# Patient Record
Sex: FEMALE | HISPANIC OR LATINO | Employment: FULL TIME | URBAN - METROPOLITAN AREA
[De-identification: names, ages, dates, MRNs, and addresses within clinical notes are randomized per-mention and may not be internally consistent; named-entity substitution may affect disease eponyms.]

---

## 2017-04-21 ENCOUNTER — OFFICE VISIT (OUTPATIENT)
Dept: URGENT CARE | Facility: PHYSICIAN GROUP | Age: 42
End: 2017-04-21
Payer: COMMERCIAL

## 2017-04-21 VITALS
HEIGHT: 62 IN | RESPIRATION RATE: 16 BRPM | TEMPERATURE: 98.4 F | BODY MASS INDEX: 29.26 KG/M2 | SYSTOLIC BLOOD PRESSURE: 120 MMHG | DIASTOLIC BLOOD PRESSURE: 84 MMHG | WEIGHT: 159 LBS | OXYGEN SATURATION: 97 % | HEART RATE: 91 BPM

## 2017-04-21 DIAGNOSIS — R07.89 OTHER CHEST PAIN: ICD-10-CM

## 2017-04-21 PROCEDURE — 99215 OFFICE O/P EST HI 40 MIN: CPT | Performed by: FAMILY MEDICINE

## 2017-04-21 RX ORDER — ASPIRIN 81 MG/1
324 TABLET, CHEWABLE ORAL ONCE
Status: COMPLETED | OUTPATIENT
Start: 2017-04-21 | End: 2017-04-21

## 2017-04-21 RX ADMIN — ASPIRIN 324 MG: 81 TABLET, CHEWABLE ORAL at 12:46

## 2017-04-21 ASSESSMENT — LIFESTYLE VARIABLES: SUBSTANCE_ABUSE: 0

## 2017-04-21 ASSESSMENT — ENCOUNTER SYMPTOMS
DIARRHEA: 0
BLURRED VISION: 0
WEIGHT LOSS: 0
DOUBLE VISION: 0
NERVOUS/ANXIOUS: 0
FEVER: 0
SORE THROAT: 0
FOCAL WEAKNESS: 0
NECK PAIN: 0
HEADACHES: 0
MYALGIAS: 0
FLANK PAIN: 0
CHILLS: 0
ABDOMINAL PAIN: 0
HEMOPTYSIS: 0
PALPITATIONS: 0
SENSORY CHANGE: 0

## 2017-04-21 ASSESSMENT — PAIN SCALES - GENERAL: PAINLEVEL: 7=MODERATE-SEVERE PAIN

## 2017-04-21 NOTE — PROGRESS NOTES
"Subjective:      Flores Arias is a 41 y.o. female who presents with Chest Pressure            Chest Pain   This is a new problem. Episode onset: Onset early this morning waxing and waning. Severe initially. No radiation. Has migrated to shoulders and back. Pressure in character. No nausea. No diaphoresis. +SOB. No cough. No wheeze. No limb swelling. No OTC medications. Pertinent negatives include no abdominal pain, fever, headaches, hemoptysis, malaise/fatigue or palpitations.   No change with exertion or position. No other aggravating or alleviating factors.  She has improved and notes that she now feels \"sore\".     Review of Systems   Constitutional: Negative for fever, chills, weight loss and malaise/fatigue.   HENT: Negative for sore throat.    Eyes: Negative for blurred vision and double vision.   Respiratory: Negative for hemoptysis.    Cardiovascular: Positive for chest pain. Negative for palpitations.   Gastrointestinal: Negative for abdominal pain and diarrhea.   Genitourinary: Negative for hematuria and flank pain.   Musculoskeletal: Negative for myalgias and neck pain.   Skin: Negative for itching and rash.   Neurological: Negative for sensory change, focal weakness and headaches.   Psychiatric/Behavioral: Negative for substance abuse. The patient is not nervous/anxious.    .  Medications, Allergies, and current problem list reviewed today in Epic  PMH: hyperlipidemia, elvated fasting glucose. No cardiac  PSxH: appendectomy 12/2016  Family History   Problem Relation Age of Onset   • Diabetes Mother    • Hypertension Father    • Heart Disease Father      MI age 69   • Diabetes Father    • Alcohol/Drug Brother        Social History     Social History   • Marital Status:      Spouse Name: N/A   • Number of Children: N/A   • Years of Education: N/A     Occupational History   • Not on file.     Social History Main Topics   • Smoking status: Never Smoker    • Smokeless tobacco: Never Used   • " "Alcohol Use: 1.2 oz/week     2 Cans of beer per week      Comment:  4 mixed drinks weekly   • Drug Use: No   • Sexual Activity:     Partners: Male     Other Topics Concern   • Not on file     Social History Narrative            Objective:     /84 mmHg  Pulse 91  Temp(Src) 36.9 °C (98.4 °F)  Resp 16  Ht 1.575 m (5' 2\")  Wt 72.122 kg (159 lb)  BMI 29.07 kg/m2  SpO2 97%  Breastfeeding? No     Physical Exam   Constitutional: She is oriented to person, place, and time. She appears well-developed and well-nourished. No distress.   HENT:   Head: Normocephalic and atraumatic.   Mouth/Throat: Oropharynx is clear and moist.   Eyes: Conjunctivae and EOM are normal. Pupils are equal, round, and reactive to light.   Neck: Neck supple. No JVD present. No tracheal deviation present. No thyromegaly present.   Cardiovascular: Normal rate, regular rhythm and normal heart sounds.  Exam reveals no gallop and no friction rub.    No murmur heard.  Pulmonary/Chest: Effort normal and breath sounds normal. She has no wheezes. She exhibits no tenderness.   Abdominal: Soft. Bowel sounds are normal. She exhibits no mass. There is no tenderness.   Musculoskeletal: She exhibits no edema or tenderness.   Neurological: She is alert and oriented to person, place, and time.   Skin: Skin is warm and dry. No rash noted.   Psychiatric: She has a normal mood and affect. Her behavior is normal.               Assessment/Plan:   EKG: NSR rate:90, normal axis, normal intervals, no evidence of ischemia or hypertrophy.    1. Other chest pain  EKG    aspirin (ASA) chewable tab 324 mg       Differential diagnosis, natural history, supportive care, and indications for immediate follow-up discussed at length.     Referred to ER for further evaluation. ERP notified.   "

## 2017-04-21 NOTE — Clinical Note
April 21, 2017         Patient: Flores Arias   YOB: 1975   Date of Visit: 4/21/2017           To Whom it May Concern:    Flores Arias was seen in my clinic on 4/21/2017. Please excuse today.       Sincerely,           Matthew Gayle M.D.  Electronically Signed

## 2017-04-21 NOTE — MR AVS SNAPSHOT
"        Flores Arias   2017 12:05 PM   Office Visit   MRN: [id number]    Department:  [facility]   Dept Phone:  [phone]    Description:  Female : 1975   Provider:  Matthew Gayle M.D.           Reason for Visit     Chest Pressure x2 days. Pt complains of chest pain/pressure, difficulty breathing. Pain radiating through chest and back. Pt woke up throughout night due to pain. Pt states no Hx of cardiac issues.      Allergies as of 2017     No Known Allergies      You were diagnosed with     Other chest pain   [786.59.ICD-9-CM]         Vital Signs     Blood Pressure Pulse Temperature Respirations Height Weight    120/84 mmHg 91 36.9 °C (98.4 °F) 16 1.575 m (5' 2\") 72.122 kg (159 lb)    Body Mass Index Oxygen Saturation Breastfeeding? Smoking Status          29.07 kg/m2 97% No Never Smoker         Basic Information     Date Of Birth Sex Race Ethnicity Preferred Language    1975 Female  or   Origin (Peruvian,Citizen of [place],Citizen of [place], etc) English      Your appointments     2017  9:20 AM   NEW TO YOU with YAA Jauregui   [facility]    [address]  [facility] [id number] [phone]              Problem List              ICD-10-CM Priority Class Noted - Resolved    Abnormal uterine bleeding (AUB) N93.9   3/30/2016 - Present    Preventative health care Z00.00   3/30/2016 - Present    Mass of left thigh R22.42   3/30/2016 - Present    Thyroid cyst E04.1   3/30/2016 - Present    Hyperlipidemia LDL goal <100 E78.5   2016 - Present    Elevated fasting glucose R73.01   2016 - Present    Right lower quadrant abdominal pain R10.31   2016 - Present      Health Maintenance        Date Due Completion Dates    IMM DTaP/Tdap/Td Vaccine (1 - Tdap) 1993 ---    PAP SMEAR 1995 ---    MAMMOGRAM 2014 ---            Current Immunizations     Hepatitis B Vaccine Recombivax " (Adol/Adult) 9/19/2016, 5/3/2016, 2/4/2016  8:07 AM, 11/12/2015  5:49 PM    Influenza Vaccine Quad Inj (Pf) 5/3/2016      Below and/or attached are the medications your provider expects you to take. Review all of your home medications and newly ordered medications with your provider and/or pharmacist. Follow medication instructions as directed by your provider and/or pharmacist. Please keep your medication list with you and share with your provider. Update the information when medications are discontinued, doses are changed, or new medications (including over-the-counter products) are added; and carry medication information at all times in the event of emergency situations     Allergies:  No Known Allergies          Medications  Valid as of: April 21, 2017 - 12:49 PM    Generic Name Brand Name Tablet Size Instructions for use    Ibuprofen (Tab) MOTRIN 200 MG Take 200 mg by mouth every 6 hours as needed.        .                 Medicines prescribed today were sent to:     [facility] #[id number] - [place], [place] - [address]    [address] [place] [address]    Phone: [phone] Fax: [phone]    Open 24 Hours?: No      Medication refill instructions:       If your prescription bottle indicates you have medication refills left, it is not necessary to call your provider’s office. Please contact your pharmacy and they will refill your medication.    If your prescription bottle indicates you do not have any refills left, you may request refills at any time through one of the following ways: The online [employer] system (except Urgent Care), by calling your provider’s office, or by asking your pharmacy to contact your provider’s office with a refill request. Medication refills are processed only during regular business hours and may not be available until the next business day. Your provider may request additional information or to have a follow-up visit with you prior to refilling your medication.   *Please Note:  Medication refills are assigned a new Rx number when refilled electronically. Your pharmacy may indicate that no refills were authorized even though a new prescription for the same medication is available at the pharmacy. Please request the medicine by name with the pharmacy before contacting your provider for a refill.        Your To Do List     Future Labs/Procedures Complete By Expires    EKG  As directed 4/21/2018         [employer] Access Code: Activation code not generated  Current [employer] Status: Active

## 2017-04-24 ENCOUNTER — OFFICE VISIT (OUTPATIENT)
Dept: MEDICAL GROUP | Facility: MEDICAL CENTER | Age: 42
End: 2017-04-24
Payer: COMMERCIAL

## 2017-04-24 VITALS
WEIGHT: 161 LBS | HEIGHT: 62 IN | TEMPERATURE: 97.6 F | DIASTOLIC BLOOD PRESSURE: 78 MMHG | HEART RATE: 74 BPM | BODY MASS INDEX: 29.63 KG/M2 | OXYGEN SATURATION: 97 % | SYSTOLIC BLOOD PRESSURE: 112 MMHG

## 2017-04-24 DIAGNOSIS — R73.09 ELEVATED HEMOGLOBIN A1C: ICD-10-CM

## 2017-04-24 DIAGNOSIS — Z12.31 ENCOUNTER FOR SCREENING MAMMOGRAM FOR BREAST CANCER: ICD-10-CM

## 2017-04-24 DIAGNOSIS — K21.9 GASTROESOPHAGEAL REFLUX DISEASE WITHOUT ESOPHAGITIS: ICD-10-CM

## 2017-04-24 DIAGNOSIS — R07.89 ATYPICAL CHEST PAIN: ICD-10-CM

## 2017-04-24 DIAGNOSIS — E78.5 HYPERLIPIDEMIA LDL GOAL <100: ICD-10-CM

## 2017-04-24 PROCEDURE — 99214 OFFICE O/P EST MOD 30 MIN: CPT | Performed by: NURSE PRACTITIONER

## 2017-04-24 RX ORDER — OMEPRAZOLE 20 MG/1
20 CAPSULE, DELAYED RELEASE ORAL DAILY
Qty: 30 CAP | Refills: 0 | Status: SHIPPED | OUTPATIENT
Start: 2017-04-24 | End: 2018-11-14

## 2017-04-24 ASSESSMENT — PATIENT HEALTH QUESTIONNAIRE - PHQ9: CLINICAL INTERPRETATION OF PHQ2 SCORE: 0

## 2017-04-24 NOTE — PROGRESS NOTES
Chief Complaint   Patient presents with   • Establish Care     follow up from  Friday      Flores Arias is a 41 y.o. female here to transfer care from [facility]. We discussed:    Elevated hemoglobin A1c  3/2016 a1c 6.2  Sometimes feeling thirsty  No polyuria, no numbness or tingling  Not watching diet, and exercise   both parents have diabetes  Atypical chest pain  Last week suddenly began having significant chest pain, radiating to bilateral shoulders. This one on throughout the night until the following day when she presented to urgent care for evaluation, EKG normal at that time. No severe symptoms since that time  States that pain began right after eating  She has no pain currently but has felt the last few nights when lying in bed, generalized chest discomfort, having some abdominal bloating and discomfort as well  No recent illness, no cough, dizziness, shortness of breath. No history of heartburn, no frequent NSAID use, minimal alcohol. No constipation, diarrhea  Hyperlipidemia LDL goal <100  Last labs in 2016 total cholesterol 243, triglyceride 151, HDL 51,   She's not made any dietary change since that time  Father had MI in his late 60s  Diet is fairly poor  No regular exercise      Current medicines (including changes today)  Current Outpatient Prescriptions   Medication Sig Dispense Refill   • omeprazole (PRILOSEC) 20 MG delayed-release capsule Take 1 Cap by mouth every day. 30 Cap 0   • ibuprofen (MOTRIN) 200 MG Tab Take 200 mg by mouth every 6 hours as needed.       No current facility-administered medications for this visit.     She  has no past medical history of ASTHMA or Diabetes.  She  has past surgical history that includes breast biopsy (2015) and appendectomy laparoscopic (N/A, 12/6/2016).  Social History   Substance Use Topics   • Smoking status: Never Smoker    • Smokeless tobacco: Never Used   • Alcohol Use: 1.2 oz/week     2 Cans of beer per week      Comment:  4 mixed  "drinks weekly     Social History     Social History Narrative     Family History   Problem Relation Age of Onset   • Diabetes Mother    • Hypertension Father    • Heart Disease Father      MI age 69   • Diabetes Father    • Alcohol/Drug Brother      Family Status   Relation Status Death Age   • Mother Alive    • Father Alive    • Brother Alive    • Daughter Alive    • Son Alive      ROS  Problems listed discussed above, all other systems reviewed and negative     Objective:     Blood pressure 112/78, pulse 74, temperature 36.4 °C (97.6 °F), height 1.575 m (5' 2\"), weight 73.029 kg (161 lb), SpO2 97 %. Body mass index is 29.44 kg/(m^2).  Physical Exam:  General: Alert, oriented in no acute distress.  Eye contact is good, speech is normal, affect calm  HEENT: perrl, Oral mucosa pink moist, no lesions. Nares patent. TMs gray with good landmarks bilaterally. No cervical or supraclavicular lymphadenopathy  Lungs: clear to auscultation bilaterally, good aeration, normal effort. No wheeze/ rhonchi/ rales.  CV: regular rate and rhythm, S1, S2. No murmur, no JVD, no edema. Pedal pulses 2 + bilaterally  Abdomen: soft, nontender, BS x4  Ext: color normal, vascularity normal, temperature normal. No rash or lesions.  MS: no point tenderness over anterior posterior chest wall  Assessment and Plan:   The following treatment plan was discussed  1. Elevated hemoglobin A1c   last A1c 6.2, both parents with diabetes. No exercise, reports poor diet. Recheck labs, consider starting metformin  HEMOGLOBIN A1C   2. Hyperlipidemia LDL goal <100    with last labs  COMP METABOLIC PANEL    LIPID PROFILE   3. Gastroesophageal reflux disease without esophagitis   onset of chest pain last week, evaluated in urgent care and with normal EKG. Pain is improved since that time but she does still have some symptoms when lying in bed. Initial onset of pain was right after a meal, she did feel some upward radiation that may be consistent with " GERD. Will start trial of:  omeprazole (PRILOSEC) 20 MG delayed-release capsule   4. Atypical chest pain   as discussed above    5. Encounter for screening mammogram for breast cancer  MA-SCREEN MAMMO W/CAD-BILAT       Educated in proper administration of medication(s) ordered today including safety, possible SE, risks, benefits, rationale and alternatives to therapy.   Followup: Pending labs             Please note that this dictation was created using voice recognition software. I have worked with consultants from the vendor as well as technical experts from [employer] to optimize the interface. I have made every reasonable attempt to correct obvious errors, but I expect that there are errors of grammar and possibly content that I did not discover before finalizing the note.

## 2017-04-24 NOTE — ASSESSMENT & PLAN NOTE
Last labs in 2016 total cholesterol 243, triglyceride 151, HDL 51,   She's not made any dietary change since that time  Father had MI in his late 60s  Diet is fairly poor  No regular exercise

## 2017-04-24 NOTE — MR AVS SNAPSHOT
"        Flores Arias   2017 9:20 AM   Office Visit   MRN: [id number]    Department:  [facility]   Dept Phone:  [phone]    Description:  Female : 1975   Provider:  YAA Jauregui           Reason for Visit     Establish Care follow up from  Friday       Allergies as of 2017     No Known Allergies      You were diagnosed with     Elevated hemoglobin A1c   [264939]       Hyperlipidemia LDL goal <100   [705109]       Gastroesophageal reflux disease without esophagitis   [391536]       Encounter for screening mammogram for breast cancer   [5434831]         Vital Signs     Blood Pressure Pulse Temperature Height Weight Body Mass Index    112/78 mmHg 74 36.4 °C (97.6 °F) 1.575 m (5' 2\") 73.029 kg (161 lb) 29.44 kg/m2    Oxygen Saturation Smoking Status                97% Never Smoker           Basic Information     Date Of Birth Sex Race Ethnicity Preferred Language    1975 Female  or   Origin (Vietnamese,Namibian,Canadian,Honduran, etc) English      Problem List              ICD-10-CM Priority Class Noted - Resolved    Abnormal uterine bleeding (AUB) N93.9   3/30/2016 - Present    Preventative health care Z00.00   3/30/2016 - Present    Mass of left thigh R22.42   3/30/2016 - Present    Thyroid cyst E04.1   3/30/2016 - Present    Hyperlipidemia LDL goal <100 E78.5   2016 - Present    Elevated fasting glucose R73.01   2016 - Present    Right lower quadrant abdominal pain R10.31   2016 - Present    Elevated hemoglobin A1c R73.09   2017 - Present      Health Maintenance        Date Due Completion Dates    IMM DTaP/Tdap/Td Vaccine (1 - Tdap) 1993 ---    PAP SMEAR 1995 ---    MAMMOGRAM 2014 ---            Current Immunizations     Hepatitis B Vaccine Recombivax (Adol/Adult) 2016, 5/3/2016, 2016  8:07 AM, 2015  5:49 PM    Influenza Vaccine Quad Inj (Pf) 5/3/2016      Below and/or attached are the medications " your provider expects you to take. Review all of your home medications and newly ordered medications with your provider and/or pharmacist. Follow medication instructions as directed by your provider and/or pharmacist. Please keep your medication list with you and share with your provider. Update the information when medications are discontinued, doses are changed, or new medications (including over-the-counter products) are added; and carry medication information at all times in the event of emergency situations     Allergies:  No Known Allergies          Medications  Valid as of: April 24, 2017 -  9:51 AM    Generic Name Brand Name Tablet Size Instructions for use    Ibuprofen (Tab) MOTRIN 200 MG Take 200 mg by mouth every 6 hours as needed.        Omeprazole (CAPSULE DELAYED RELEASE) PRILOSEC 20 MG Take 1 Cap by mouth every day.        .                 Medicines prescribed today were sent to:     [facility] #[id number] - [address] [address]    Phone: [phone] Fax: [phone]    Open 24 Hours?: No      Medication refill instructions:       If your prescription bottle indicates you have medication refills left, it is not necessary to call your provider’s office. Please contact your pharmacy and they will refill your medication.    If your prescription bottle indicates you do not have any refills left, you may request refills at any time through one of the following ways: The online [employer] system (except Urgent Care), by calling your provider’s office, or by asking your pharmacy to contact your provider’s office with a refill request. Medication refills are processed only during regular business hours and may not be available until the next business day. Your provider may request additional information or to have a follow-up visit with you prior to refilling your medication.   *Please Note: Medication refills are assigned a new Rx number when refilled electronically. Your  pharmacy may indicate that no refills were authorized even though a new prescription for the same medication is available at the pharmacy. Please request the medicine by name with the pharmacy before contacting your provider for a refill.        Your To Do List     Future Labs/Procedures Complete By Expires    COMP METABOLIC PANEL  As directed 4/25/2018    HEMOGLOBIN A1C  As directed 4/25/2018    LIPID PROFILE  As directed 4/25/2018    MA-SCREEN MAMMO W/CAD-BILAT  As directed 5/26/2018         MyChart Access Code: Activation code not generated  Current [employer] Status: Active

## 2017-04-24 NOTE — ASSESSMENT & PLAN NOTE
3/2016 a1c 6.2  Sometimes feeling thirsty  No polyuria, no numbness or tingling  Not watching diet, and exercise   both parents have diabetes

## 2017-04-24 NOTE — ASSESSMENT & PLAN NOTE
Last week suddenly began having significant chest pain, radiating to bilateral shoulders  Presented to urgent care for evaluation on the 21st, EKG normal at that time.  States that pain began right after eating, remains severe throughout that night and has lessened since that time  She has no pain currently but has felt the last few nights when lying in bed, generalized chest discomfort, having some abdominal bloating and discomfort as well  No recent illness, no cough, dizziness, shortness of breath. No history of heartburn, no frequent NSAID use, minimal alcohol. No constipation, diarrhea

## 2017-05-05 ENCOUNTER — HOSPITAL ENCOUNTER (OUTPATIENT)
Dept: RADIOLOGY | Facility: MEDICAL CENTER | Age: 42
End: 2017-05-05

## 2017-06-12 ENCOUNTER — NON-PROVIDER VISIT (OUTPATIENT)
Dept: URGENT CARE | Facility: PHYSICIAN GROUP | Age: 42
End: 2017-06-12

## 2017-06-12 DIAGNOSIS — Z02.1 PRE-EMPLOYMENT DRUG SCREENING: ICD-10-CM

## 2017-06-12 LAB
AMP AMPHETAMINE: NORMAL
COC COCAINE: NORMAL
INT CON NEG: NEGATIVE
INT CON POS: POSITIVE
MET METHAMPHETAMINES: NORMAL
OPI OPIATES: NORMAL
PCP PHENCYCLIDINE: NORMAL
POC DRUG COMMENT 753798-OCCUPATIONAL HEALTH: NORMAL
THC: NORMAL

## 2017-06-12 PROCEDURE — 80305 DRUG TEST PRSMV DIR OPT OBS: CPT | Performed by: PHYSICIAN ASSISTANT

## 2018-02-05 ENCOUNTER — OFFICE VISIT (OUTPATIENT)
Dept: MEDICAL GROUP | Facility: MEDICAL CENTER | Age: 43
End: 2018-02-05
Payer: COMMERCIAL

## 2018-02-05 VITALS
TEMPERATURE: 98.2 F | SYSTOLIC BLOOD PRESSURE: 110 MMHG | DIASTOLIC BLOOD PRESSURE: 64 MMHG | HEART RATE: 76 BPM | RESPIRATION RATE: 16 BRPM | HEIGHT: 62 IN | BODY MASS INDEX: 26.43 KG/M2 | WEIGHT: 143.6 LBS | OXYGEN SATURATION: 97 %

## 2018-02-05 DIAGNOSIS — R73.09 ELEVATED HEMOGLOBIN A1C: ICD-10-CM

## 2018-02-05 DIAGNOSIS — R22.42 MASS OF LEFT THIGH: ICD-10-CM

## 2018-02-05 PROCEDURE — 99214 OFFICE O/P EST MOD 30 MIN: CPT | Performed by: NURSE PRACTITIONER

## 2018-02-05 RX ORDER — DIAZEPAM 5 MG/1
TABLET ORAL
Qty: 1 TAB | Refills: 0 | Status: SHIPPED | OUTPATIENT
Start: 2018-02-05 | End: 2018-02-10

## 2018-02-05 RX ORDER — M-VIT,TX,IRON,MINS/CALC/FOLIC 27MG-0.4MG
1 TABLET ORAL DAILY
COMMUNITY
End: 2018-11-14

## 2018-02-06 ENCOUNTER — HOSPITAL ENCOUNTER (OUTPATIENT)
Dept: RADIOLOGY | Facility: MEDICAL CENTER | Age: 43
End: 2018-02-06
Attending: NURSE PRACTITIONER
Payer: COMMERCIAL

## 2018-02-06 DIAGNOSIS — Z12.31 ENCOUNTER FOR SCREENING MAMMOGRAM FOR BREAST CANCER: ICD-10-CM

## 2018-02-06 PROCEDURE — 77063 BREAST TOMOSYNTHESIS BI: CPT

## 2018-02-06 NOTE — ASSESSMENT & PLAN NOTE
6.2 in 2016  Has cut out sugar and soda, lost 20 lbs  Both parents have diabetes  No regular exercise  No polyuria, polydipsia, numbness or tingling in extremities

## 2018-02-06 NOTE — PROGRESS NOTES
"Subjective:     Chief Complaint   Patient presents with   • Mass     left leg      Flores Arias is a 42 y.o. female here today to follow up on:    Elevated hemoglobin A1c  6.2 in 2016  Has cut out sugar and soda, lost 20 lbs  Both parents have diabetes  No regular exercise  No polyuria, polydipsia, numbness or tingling in extremities     Mass of left thigh  Mass on the left thigh that's been present for about 2 years, ultrasound at 2016 showing 16 mm atypical mass that could possibly be a lymph node. 6 month follow-up had been recommended the patient did not return at that time. She now reports mass is about twice as big. No associated pain, no impact on mobility. No unexplained weight loss, fatigue       Current medicines (including changes today)  Current Outpatient Prescriptions   Medication Sig Dispense Refill   • therapeutic multivitamin-minerals (THERAGRAN-M) Tab Take 1 Tab by mouth every day.     • diazePAM (VALIUM) 5 MG Tab 1 tab PO 30 minutes prior to procedure 1 Tab 0   • omeprazole (PRILOSEC) 20 MG delayed-release capsule Take 1 Cap by mouth every day. 30 Cap 0   • ibuprofen (MOTRIN) 200 MG Tab Take 200 mg by mouth every 6 hours as needed.       No current facility-administered medications for this visit.      She  has no past medical history of ASTHMA or Diabetes.    ROS included above     Objective:     Blood pressure 110/64, pulse 76, temperature 36.8 °C (98.2 °F), resp. rate 16, height 1.575 m (5' 2\"), weight 65.1 kg (143 lb 9.6 oz), SpO2 97 %, not currently breastfeeding. Body mass index is 26.26 kg/m².     Physical Exam:  General: Alert, oriented in no acute distress.  Eye contact is good, speech is normal, affect calm  Lungs: clear to auscultation bilaterally, normal effort, no wheeze/ rhonchi/ rales.  CV: regular rate and rhythm, S1, S2, no murmur  Abdomen: soft, nontender  Ext: approximately 6 cm mobile nontender mass in the L groin/ upper thigh. no edema, color normal, vascularity normal, " temperature normal    Assessment and Plan:   The following treatment plan was discussed   1. Mass of left thigh  Mass has doubled in size since last evaluated. Will check MRI. One time dose of diazepam for procedure, advised she will need a . F/u pending result    MR-LOWER EXTREMITY-NO JOINT-W/O LEFT    diazePAM (VALIUM) 5 MG Tab   2. Elevated hemoglobin A1c  She has been working on diet and weight loss, down 20 lbs. She will complete labs as previously ordered       Followup: pending tests       Please note that this dictation was created using voice recognition software. I have worked with consultants from the vendor as well as technical experts from [employer] to optimize the interface. I have made every reasonable attempt to correct obvious errors, but I expect that there are errors of grammar and possibly content that I did not discover before finalizing the note.

## 2018-02-06 NOTE — ASSESSMENT & PLAN NOTE
Mass on the left thigh that's been present for about 2 years, ultrasound at 2016 showing 16 mm atypical mass that could possibly be a lymph node. 6 month follow-up had been recommended the patient did not return at that time. She now reports mass is about twice as big. No associated pain, no impact on mobility. No unexplained weight loss, fatigue

## 2018-02-15 ENCOUNTER — APPOINTMENT (OUTPATIENT)
Dept: RADIOLOGY | Facility: MEDICAL CENTER | Age: 43
End: 2018-02-15
Attending: NURSE PRACTITIONER
Payer: COMMERCIAL

## 2018-02-15 DIAGNOSIS — R22.42 MASS OF LEFT THIGH: ICD-10-CM

## 2018-02-15 PROCEDURE — 700117 HCHG RX CONTRAST REV CODE 255: Performed by: NURSE PRACTITIONER

## 2018-02-15 PROCEDURE — A9579 GAD-BASE MR CONTRAST NOS,1ML: HCPCS | Performed by: NURSE PRACTITIONER

## 2018-02-15 PROCEDURE — 73720 MRI LWR EXTREMITY W/O&W/DYE: CPT | Mod: LT

## 2018-02-15 RX ADMIN — GADODIAMIDE 15 ML: 287 INJECTION INTRAVENOUS at 13:01

## 2018-03-06 ENCOUNTER — HOSPITAL ENCOUNTER (OUTPATIENT)
Dept: LAB | Facility: MEDICAL CENTER | Age: 43
End: 2018-03-06
Attending: NURSE PRACTITIONER
Payer: COMMERCIAL

## 2018-03-06 ENCOUNTER — OFFICE VISIT (OUTPATIENT)
Dept: MEDICAL GROUP | Facility: MEDICAL CENTER | Age: 43
End: 2018-03-06
Payer: COMMERCIAL

## 2018-03-06 ENCOUNTER — HOSPITAL ENCOUNTER (OUTPATIENT)
Facility: MEDICAL CENTER | Age: 43
End: 2018-03-06
Attending: NURSE PRACTITIONER
Payer: COMMERCIAL

## 2018-03-06 VITALS
TEMPERATURE: 97.8 F | SYSTOLIC BLOOD PRESSURE: 110 MMHG | HEIGHT: 62 IN | BODY MASS INDEX: 25.17 KG/M2 | DIASTOLIC BLOOD PRESSURE: 72 MMHG | RESPIRATION RATE: 12 BRPM | OXYGEN SATURATION: 98 % | HEART RATE: 76 BPM | WEIGHT: 136.8 LBS

## 2018-03-06 DIAGNOSIS — E78.5 HYPERLIPIDEMIA LDL GOAL <100: ICD-10-CM

## 2018-03-06 DIAGNOSIS — R73.09 ELEVATED HEMOGLOBIN A1C: ICD-10-CM

## 2018-03-06 DIAGNOSIS — N92.6 IRREGULAR MENSES: ICD-10-CM

## 2018-03-06 DIAGNOSIS — Z01.419 ENCOUNTER FOR GYNECOLOGICAL EXAMINATION WITHOUT ABNORMAL FINDING: ICD-10-CM

## 2018-03-06 LAB
ALBUMIN SERPL BCP-MCNC: 4.1 G/DL (ref 3.2–4.9)
ALBUMIN/GLOB SERPL: 1.5 G/DL
ALP SERPL-CCNC: 45 U/L (ref 30–99)
ALT SERPL-CCNC: 12 U/L (ref 2–50)
ANION GAP SERPL CALC-SCNC: 8 MMOL/L (ref 0–11.9)
AST SERPL-CCNC: 16 U/L (ref 12–45)
BILIRUB SERPL-MCNC: 0.6 MG/DL (ref 0.1–1.5)
BUN SERPL-MCNC: 9 MG/DL (ref 8–22)
CALCIUM SERPL-MCNC: 9.2 MG/DL (ref 8.5–10.5)
CHLORIDE SERPL-SCNC: 104 MMOL/L (ref 96–112)
CHOLEST SERPL-MCNC: 167 MG/DL (ref 100–199)
CO2 SERPL-SCNC: 29 MMOL/L (ref 20–33)
CREAT SERPL-MCNC: 0.76 MG/DL (ref 0.5–1.4)
EST. AVERAGE GLUCOSE BLD GHB EST-MCNC: 126 MG/DL
GLOBULIN SER CALC-MCNC: 2.8 G/DL (ref 1.9–3.5)
GLUCOSE SERPL-MCNC: 87 MG/DL (ref 65–99)
HBA1C MFR BLD: 6 % (ref 0–5.6)
HDLC SERPL-MCNC: 56 MG/DL
LDLC SERPL CALC-MCNC: 94 MG/DL
POTASSIUM SERPL-SCNC: 3.4 MMOL/L (ref 3.6–5.5)
PROT SERPL-MCNC: 6.9 G/DL (ref 6–8.2)
SODIUM SERPL-SCNC: 141 MMOL/L (ref 135–145)
TRIGL SERPL-MCNC: 85 MG/DL (ref 0–149)

## 2018-03-06 PROCEDURE — 36415 COLL VENOUS BLD VENIPUNCTURE: CPT

## 2018-03-06 PROCEDURE — 99396 PREV VISIT EST AGE 40-64: CPT | Performed by: NURSE PRACTITIONER

## 2018-03-06 PROCEDURE — 99000 SPECIMEN HANDLING OFFICE-LAB: CPT | Performed by: NURSE PRACTITIONER

## 2018-03-06 PROCEDURE — 80053 COMPREHEN METABOLIC PANEL: CPT

## 2018-03-06 PROCEDURE — 88175 CYTOPATH C/V AUTO FLUID REDO: CPT

## 2018-03-06 PROCEDURE — 87624 HPV HI-RISK TYP POOLED RSLT: CPT

## 2018-03-06 PROCEDURE — 83036 HEMOGLOBIN GLYCOSYLATED A1C: CPT

## 2018-03-06 PROCEDURE — 80061 LIPID PANEL: CPT

## 2018-03-06 NOTE — PROGRESS NOTES
CC:  Pap/Well Woman Exam    History of present illness:  Flores Arias is 42 y.o.  female presenting today for well woman exam with gynecological exam and Pap smear. Menstrual cycle has been irregular in the last 2 years, ranging from 48 weeks at a time. She had been having very heavy bleeding about 2 years ago, was seen by Dr. Griffith at that time and had an ultrasound which showed fibroids. She had an endometrial biopsy which was benign. Since then her periods have been better, not as heavy or painful.  Last Pap was several years ago, she did have HPV in the past. She is , monogamous, denies concerns for STDs.  has had vasectomy. She is up-to-date on mammogram. We also discussed:    Elevated hemoglobin A1c  Last A1c 6.2 in 2016  She was able to lose some weight, has changed her diet  Due for follow-up labs  No polyuria, polydipsia    Hyperlipidemia LDL goal <100  Last   She's been working on diet  Not exercising regularly      No past medical history on file.    Past Surgical History:   Procedure Laterality Date   • APPENDECTOMY LAPAROSCOPIC N/A 2016    Procedure: APPENDECTOMY LAPAROSCOPIC;  Surgeon: Pilar Jennings M.D.;  Location: SURGERY [facility];  Service:    • BREAST BIOPSY      left breast, benign biopsy       Outpatient Encounter Prescriptions as of 3/6/2018   Medication Sig Dispense Refill   • therapeutic multivitamin-minerals (THERAGRAN-M) Tab Take 1 Tab by mouth every day.     • omeprazole (PRILOSEC) 20 MG delayed-release capsule Take 1 Cap by mouth every day. 30 Cap 0   • ibuprofen (MOTRIN) 200 MG Tab Take 200 mg by mouth every 6 hours as needed.       No facility-administered encounter medications on file as of 3/6/2018.        Patient Active Problem List    Diagnosis Date Noted   • Elevated hemoglobin A1c 2017   • Atypical chest pain 2017   • Hyperlipidemia LDL goal <100 2016   • Elevated fasting glucose 2016   • Abnormal  "uterine bleeding (AUB) 03/30/2016   • Preventative health care 03/30/2016   • Mass of left thigh 03/30/2016   • Thyroid cyst 03/30/2016       .  Social History     Social History   • Marital status:      Spouse name: N/A   • Number of children: N/A   • Years of education: N/A     Occupational History   • Not on file.     Social History Main Topics   • Smoking status: Never Smoker   • Smokeless tobacco: Never Used   • Alcohol use 1.2 oz/week     2 Cans of beer per week      Comment:  4 mixed drinks weekly   • Drug use: No   • Sexual activity: Yes     Partners: Male     Other Topics Concern   • Not on file     Social History Narrative   • No narrative on file       Family History   Problem Relation Age of Onset   • Diabetes Mother    • Hypertension Father    • Heart Disease Father      MI age 69   • Diabetes Father    • Alcohol/Drug Brother          ROS: Denies Weight loss, fatigue, chest pain, SOB, bowel or bladder changes. No significant dysmenorrhea, concerning vaginal discharge or irritation, no dyspareunia or postcoital bleeding. Denies h/o migraine with aura. Denies musculoskeletal, neurological, or psychiatric problems.      /72   Pulse 76   Temp 36.6 °C (97.8 °F)   Resp 12   Ht 1.575 m (5' 2\")   Wt 62.1 kg (136 lb 12.8 oz)   LMP 01/30/2018   SpO2 98%   Breastfeeding? No   BMI 25.02 kg/m²     GEN:  Appears well and in no apparent distress   NECK:  Supple without adenopathy or thyromegaly  LUNGS:  Clear and equal. No wheeze, ronchi, or rales.  CV:  RRR, S1, S2. No murmur.  Pedal pulses 2+ bilaterally.  BREAST:  Symmetrical without masses. No nipple discharge.  ABD:  Soft, non-tender, non-distended, normal bowel sounds.  No hepatosplenomegaly.  :  Normal external female genitalia.  Vaginal canal clear.  Cervix appears normal. Specimen collected from transformation zone. Bimanual exam:  No CMT, normal size uterus without masses or tenderness; no adnexal masses or tenderness.      Assessment " and plan    1. Encounter for gynecological examination without abnormal finding  Normal exam. Pap sent, follow-up pending results. Breast self-exam taught and encouraged monthly. General health and wellness discussion including healthy diet, regular exercise  - THINPREP PAP WITH HPV; Future    2. Hyperlipidemia LDL goal <100  Repeat labs  - LIPID PROFILE; Future  - COMP METABOLIC PANEL; Future    3. Irregular menses  - TSH WITH REFLEX TO FT4; Future    4. Elevated hemoglobin A1c  Last A1c 6.2. Working on diet and exercise. Reevaluate  - HEMOGLOBIN A1C; Future      F/u pending results

## 2018-03-06 NOTE — ASSESSMENT & PLAN NOTE
Last A1c 6.2 in 2016  She was able to lose some weight, has changed her diet  Due for follow-up labs  No polyuria, polydipsia

## 2018-03-06 NOTE — ASSESSMENT & PLAN NOTE
Irregular cycle every 4-6 weeks, sometimes heavy bleeding, sometimes light  Prior u/s with fibroids  Saw Dr Cervantes and had benign endometrial biopsy

## 2018-03-07 LAB
CYTOLOGY REG CYTOL: NORMAL
HPV HR 12 DNA CVX QL NAA+PROBE: NEGATIVE
HPV16 DNA SPEC QL NAA+PROBE: NEGATIVE
HPV18 DNA SPEC QL NAA+PROBE: NEGATIVE
SPECIMEN SOURCE: NORMAL

## 2018-03-09 ENCOUNTER — HOSPITAL ENCOUNTER (OUTPATIENT)
Dept: LAB | Facility: MEDICAL CENTER | Age: 43
End: 2018-03-09
Attending: NURSE PRACTITIONER
Payer: COMMERCIAL

## 2018-03-09 LAB — TSH SERPL DL<=0.005 MIU/L-ACNC: 1.26 UIU/ML (ref 0.38–5.33)

## 2018-03-09 PROCEDURE — 84443 ASSAY THYROID STIM HORMONE: CPT

## 2018-03-09 PROCEDURE — 36415 COLL VENOUS BLD VENIPUNCTURE: CPT

## 2018-05-23 ENCOUNTER — OFFICE VISIT (OUTPATIENT)
Dept: MEDICAL GROUP | Facility: MEDICAL CENTER | Age: 43
End: 2018-05-23
Payer: COMMERCIAL

## 2018-05-23 VITALS
HEART RATE: 78 BPM | DIASTOLIC BLOOD PRESSURE: 70 MMHG | TEMPERATURE: 97.6 F | OXYGEN SATURATION: 99 % | RESPIRATION RATE: 16 BRPM | SYSTOLIC BLOOD PRESSURE: 102 MMHG | HEIGHT: 62 IN | WEIGHT: 133 LBS | BODY MASS INDEX: 24.48 KG/M2

## 2018-05-23 DIAGNOSIS — Z30.09 FAMILY PLANNING: ICD-10-CM

## 2018-05-23 PROCEDURE — 99213 OFFICE O/P EST LOW 20 MIN: CPT | Performed by: NURSE PRACTITIONER

## 2018-05-23 RX ORDER — NORGESTIMATE AND ETHINYL ESTRADIOL 0.25-0.035
1 KIT ORAL DAILY
Qty: 84 TAB | Refills: 3 | Status: SHIPPED | OUTPATIENT
Start: 2018-05-23 | End: 2018-11-14

## 2018-05-23 ASSESSMENT — PATIENT HEALTH QUESTIONNAIRE - PHQ9: CLINICAL INTERPRETATION OF PHQ2 SCORE: 0

## 2018-05-23 NOTE — ASSESSMENT & PLAN NOTE
Requesting oral contraceptive. She recently  from her  is now in a new relationship, she reports consistent use of condoms but would like to be on birth control to be sure.  She had received Depo-Provera in the past but has never taken an oral contraceptive. She was not happy with Depo-Provera side effects  No history of DVT, clotting disorder, liver disorder. No migraine with aura  Pap completed in March and normal

## 2018-05-23 NOTE — PROGRESS NOTES
"Subjective:     Chief Complaint   Patient presents with   • Contraception     Flores Arias is a 42 y.o. female here today to follow up on:    Family planning  Requesting oral contraceptive. She recently  from her  is now in a new relationship, she reports consistent use of condoms but would like to be on birth control to be sure.  She had received Depo-Provera in the past but has never taken an oral contraceptive. She was not happy with Depo-Provera side effects  No history of DVT, clotting disorder, liver disorder. No migraine with aura  Pap completed in March and normal       Current medicines (including changes today)  Current Outpatient Prescriptions   Medication Sig Dispense Refill   • norgestimate-ethinyl estradiol (ORTHO-CYCLEN) 0.25-35 MG-MCG per tablet Take 1 Tab by mouth every day. 84 Tab 3   • therapeutic multivitamin-minerals (THERAGRAN-M) Tab Take 1 Tab by mouth every day.     • omeprazole (PRILOSEC) 20 MG delayed-release capsule Take 1 Cap by mouth every day. 30 Cap 0   • ibuprofen (MOTRIN) 200 MG Tab Take 200 mg by mouth every 6 hours as needed.       No current facility-administered medications for this visit.      She  has no past medical history of ASTHMA or Diabetes.    ROS included above     Objective:     Blood pressure 102/70, pulse 78, temperature 36.4 °C (97.6 °F), resp. rate 16, height 1.575 m (5' 2\"), weight 60.3 kg (133 lb), SpO2 99 %, not currently breastfeeding. Body mass index is 24.33 kg/m².     Physical Exam:  General: Alert, oriented in no acute distress.  Eye contact is good, speech is normal, affect calm  Lungs: clear to auscultation bilaterally, normal effort, no wheeze/ rhonchi/ rales.  CV: regular rate and rhythm, S1, S2, no murmur  Abdomen: soft, nontender, No CVAT, no hepatosplenomegaly  Ext: no edema, color normal, vascularity normal, temperature normal    Assessment and Plan:   The following treatment plan was discussed   1. Family planning  Requesting " start oral contraceptive. No history of hypertension, DVT, no tobacco use. last menstrual period one week ago. Will start:  norgestimate-ethinyl estradiol (ORTHO-CYCLEN) 0.25-35 MG-MCG per tablet  We discussed that birth control pills can increase risk of blood clots, liver tumors, gallbladder disease, and stroke. Side effects can include headache, constipation nausea. They do not protect against STDs. They are not effective if not taken everyday, if more than one pill is missed a backup method should be used. Antibiotics can make the pill less effective, she takes antibiotic a backup method should be used. Any unusual headache leg pain chest pain shortness of breath difficulty speaking or moving should be addressed immediately.           Followup: annually         Please note that this dictation was created using voice recognition software. I have worked with consultants from the vendor as well as technical experts from [facility] [employer] to optimize the interface. I have made every reasonable attempt to correct obvious errors, but I expect that there are errors of grammar and possibly content that I did not discover before finalizing the note.

## 2018-08-15 ENCOUNTER — OFFICE VISIT (OUTPATIENT)
Dept: MEDICAL GROUP | Facility: MEDICAL CENTER | Age: 43
End: 2018-08-15
Payer: COMMERCIAL

## 2018-08-15 ENCOUNTER — HOSPITAL ENCOUNTER (OUTPATIENT)
Facility: MEDICAL CENTER | Age: 43
End: 2018-08-15
Attending: NURSE PRACTITIONER
Payer: COMMERCIAL

## 2018-08-15 VITALS
HEIGHT: 62 IN | RESPIRATION RATE: 16 BRPM | HEART RATE: 81 BPM | DIASTOLIC BLOOD PRESSURE: 80 MMHG | OXYGEN SATURATION: 97 % | BODY MASS INDEX: 23.55 KG/M2 | TEMPERATURE: 97.6 F | SYSTOLIC BLOOD PRESSURE: 110 MMHG | WEIGHT: 128 LBS

## 2018-08-15 DIAGNOSIS — R19.8 ABDOMINAL FULLNESS: ICD-10-CM

## 2018-08-15 DIAGNOSIS — Z11.3 SCREEN FOR STD (SEXUALLY TRANSMITTED DISEASE): ICD-10-CM

## 2018-08-15 PROCEDURE — 99214 OFFICE O/P EST MOD 30 MIN: CPT | Performed by: NURSE PRACTITIONER

## 2018-08-15 PROCEDURE — 87591 N.GONORRHOEAE DNA AMP PROB: CPT

## 2018-08-15 PROCEDURE — 87491 CHLMYD TRACH DNA AMP PROBE: CPT

## 2018-08-15 PROCEDURE — 87660 TRICHOMONAS VAGIN DIR PROBE: CPT

## 2018-08-15 PROCEDURE — 87510 GARDNER VAG DNA DIR PROBE: CPT

## 2018-08-15 PROCEDURE — 87480 CANDIDA DNA DIR PROBE: CPT

## 2018-08-15 NOTE — PROGRESS NOTES
"Subjective:     Chief Complaint   Patient presents with   • Other     STD TESTING     Flores Arias is a 42 y.o. female established patient here requesting STD screening.  She has had a new partner in the last few months, is not consistently using condoms.  She is on oral contraceptive and doing well with this, last menstrual period 2 weeks ago.  She denies any specific concerns including vaginal discharge, dysuria, pelvic pain.  She has lost weight recently through diet and exercise and feels that she has some lower abdominal distention and fullness.  She was noted to have a fibroid in 2016.    No problem-specific Assessment & Plan notes found for this encounter.       Current medicines (including changes today)  Current Outpatient Prescriptions   Medication Sig Dispense Refill   • norgestimate-ethinyl estradiol (ORTHO-CYCLEN) 0.25-35 MG-MCG per tablet Take 1 Tab by mouth every day. 84 Tab 3   • therapeutic multivitamin-minerals (THERAGRAN-M) Tab Take 1 Tab by mouth every day.     • omeprazole (PRILOSEC) 20 MG delayed-release capsule Take 1 Cap by mouth every day. 30 Cap 0   • ibuprofen (MOTRIN) 200 MG Tab Take 200 mg by mouth every 6 hours as needed.       No current facility-administered medications for this visit.      She  has no past medical history of ASTHMA or Diabetes.    ROS included above     Objective:     Blood pressure 110/80, pulse 81, temperature 36.4 °C (97.6 °F), resp. rate 16, height 1.575 m (5' 2\"), weight 58.1 kg (128 lb), SpO2 97 %. Body mass index is 23.41 kg/m².     Physical Exam:  General: Alert, oriented in no acute distress.  Eye contact is good, speech is normal, affect calm  Lungs: clear to auscultation bilaterally, normal effort, no wheeze/ rhonchi/ rales.  CV: regular rate and rhythm, S1, S2, no murmur  Abdomen: soft, nontender  Pelvic: external genitalia pink, moist. No lesions. Vaginal canal with scant white discharge. Cervical os clear, no visible lesions.  Vaginal swabs " collected one. Bimanual: uterus possibly slightly enlarged, midline, no CMT, no adnexal masses or tenderness.  Ext: no edema, color normal, vascularity normal, temperature normal    Assessment and Plan:   The following treatment plan was discussed   1. Abdominal fullness   CT from 2016 showing uterine fibroid measuring 5.3 cm.  Questionable uterine enlargement on exam.  Repeat ultrasound  US-GYN-PELVIS TRANSVAGINAL   2. Screen for STD (sexually transmitted disease)   requesting STD screening, denies symptoms.  Discussed testing for HIV, hepatitis, syphilis.  Patient declines further labs at this time  CHLAMYDIA/GC PCR URINE OR SWAB    VAGINAL PATHOGENS DNA PANEL       Followup: Pending test         Please note that this dictation was created using voice recognition software. I have worked with consultants from the vendor as well as technical experts from [employer] to optimize the interface. I have made every reasonable attempt to correct obvious errors, but I expect that there are errors of grammar and possibly content that I did not discover before finalizing the note.

## 2018-08-16 DIAGNOSIS — Z11.3 SCREEN FOR STD (SEXUALLY TRANSMITTED DISEASE): ICD-10-CM

## 2018-08-16 LAB
C TRACH DNA SPEC QL NAA+PROBE: NEGATIVE
CANDIDA DNA VAG QL PROBE+SIG AMP: NEGATIVE
G VAGINALIS DNA VAG QL PROBE+SIG AMP: POSITIVE
N GONORRHOEA DNA SPEC QL NAA+PROBE: NEGATIVE
SPECIMEN SOURCE: NORMAL
T VAGINALIS DNA VAG QL PROBE+SIG AMP: NEGATIVE

## 2018-08-19 ENCOUNTER — HOSPITAL ENCOUNTER (OUTPATIENT)
Dept: RADIOLOGY | Facility: MEDICAL CENTER | Age: 43
End: 2018-08-19
Attending: NURSE PRACTITIONER
Payer: COMMERCIAL

## 2018-08-19 DIAGNOSIS — R19.8 ABDOMINAL FULLNESS: ICD-10-CM

## 2018-08-19 PROCEDURE — 76830 TRANSVAGINAL US NON-OB: CPT

## 2018-08-20 DIAGNOSIS — D25.9 UTERINE LEIOMYOMA, UNSPECIFIED LOCATION: ICD-10-CM

## 2018-08-21 ENCOUNTER — PATIENT MESSAGE (OUTPATIENT)
Dept: MEDICAL GROUP | Facility: MEDICAL CENTER | Age: 43
End: 2018-08-21

## 2018-08-22 NOTE — TELEPHONE ENCOUNTER
From: Flores Arias  To: YAA Jauregui  Sent: 8/21/2018 10:01 AM PDT  Subject: Test Result Question    Did my fibroid size change from the last ultrasound? And it says Fibroids, as in one than one! Do I have multiple fibroids?? Did I have more than one the last visit?

## 2018-11-14 ENCOUNTER — APPOINTMENT (OUTPATIENT)
Dept: RADIOLOGY | Facility: MEDICAL CENTER | Age: 43
End: 2018-11-14
Attending: EMERGENCY MEDICINE
Payer: COMMERCIAL

## 2018-11-14 ENCOUNTER — HOSPITAL ENCOUNTER (OUTPATIENT)
Facility: MEDICAL CENTER | Age: 43
End: 2018-11-15
Attending: EMERGENCY MEDICINE | Admitting: INTERNAL MEDICINE
Payer: COMMERCIAL

## 2018-11-14 DIAGNOSIS — F41.8 ANXIETY ABOUT HEALTH: ICD-10-CM

## 2018-11-14 DIAGNOSIS — Q76.49 VERTEBRAL ANOMALY: ICD-10-CM

## 2018-11-14 DIAGNOSIS — R42 VERTIGO: ICD-10-CM

## 2018-11-14 PROBLEM — E87.6 HYPOKALEMIA: Status: ACTIVE | Noted: 2018-11-14

## 2018-11-14 LAB
ALBUMIN SERPL BCP-MCNC: 3.8 G/DL (ref 3.2–4.9)
ALBUMIN/GLOB SERPL: 1.4 G/DL
ALP SERPL-CCNC: 41 U/L (ref 30–99)
ALT SERPL-CCNC: 7 U/L (ref 2–50)
ANION GAP SERPL CALC-SCNC: 11 MMOL/L (ref 0–11.9)
AST SERPL-CCNC: 12 U/L (ref 12–45)
BASOPHILS # BLD AUTO: 0.6 % (ref 0–1.8)
BASOPHILS # BLD: 0.06 K/UL (ref 0–0.12)
BILIRUB SERPL-MCNC: 0.6 MG/DL (ref 0.1–1.5)
BUN SERPL-MCNC: 8 MG/DL (ref 8–22)
CALCIUM SERPL-MCNC: 8.6 MG/DL (ref 8.5–10.5)
CHLORIDE SERPL-SCNC: 103 MMOL/L (ref 96–112)
CO2 SERPL-SCNC: 22 MMOL/L (ref 20–33)
CREAT SERPL-MCNC: 0.63 MG/DL (ref 0.5–1.4)
EOSINOPHIL # BLD AUTO: 0.01 K/UL (ref 0–0.51)
EOSINOPHIL NFR BLD: 0.1 % (ref 0–6.9)
ERYTHROCYTE [DISTWIDTH] IN BLOOD BY AUTOMATED COUNT: 42.4 FL (ref 35.9–50)
GLOBULIN SER CALC-MCNC: 2.7 G/DL (ref 1.9–3.5)
GLUCOSE SERPL-MCNC: 193 MG/DL (ref 65–99)
HCG SERPL QL: NEGATIVE
HCT VFR BLD AUTO: 36.4 % (ref 37–47)
HGB BLD-MCNC: 11.6 G/DL (ref 12–16)
IMM GRANULOCYTES # BLD AUTO: 0.02 K/UL (ref 0–0.11)
IMM GRANULOCYTES NFR BLD AUTO: 0.2 % (ref 0–0.9)
LYMPHOCYTES # BLD AUTO: 1.3 K/UL (ref 1–4.8)
LYMPHOCYTES NFR BLD: 12.9 % (ref 22–41)
MCH RBC QN AUTO: 26.4 PG (ref 27–33)
MCHC RBC AUTO-ENTMCNC: 31.9 G/DL (ref 33.6–35)
MCV RBC AUTO: 82.9 FL (ref 81.4–97.8)
MONOCYTES # BLD AUTO: 0.42 K/UL (ref 0–0.85)
MONOCYTES NFR BLD AUTO: 4.2 % (ref 0–13.4)
NEUTROPHILS # BLD AUTO: 8.29 K/UL (ref 2–7.15)
NEUTROPHILS NFR BLD: 82 % (ref 44–72)
NRBC # BLD AUTO: 0 K/UL
NRBC BLD-RTO: 0 /100 WBC
PLATELET # BLD AUTO: 323 K/UL (ref 164–446)
PMV BLD AUTO: 10.8 FL (ref 9–12.9)
POTASSIUM SERPL-SCNC: 3.2 MMOL/L (ref 3.6–5.5)
PROT SERPL-MCNC: 6.5 G/DL (ref 6–8.2)
RBC # BLD AUTO: 4.39 M/UL (ref 4.2–5.4)
SODIUM SERPL-SCNC: 136 MMOL/L (ref 135–145)
WBC # BLD AUTO: 10.1 K/UL (ref 4.8–10.8)

## 2018-11-14 PROCEDURE — A9270 NON-COVERED ITEM OR SERVICE: HCPCS | Performed by: EMERGENCY MEDICINE

## 2018-11-14 PROCEDURE — G0378 HOSPITAL OBSERVATION PER HR: HCPCS

## 2018-11-14 PROCEDURE — 96376 TX/PRO/DX INJ SAME DRUG ADON: CPT

## 2018-11-14 PROCEDURE — 70498 CT ANGIOGRAPHY NECK: CPT

## 2018-11-14 PROCEDURE — 700111 HCHG RX REV CODE 636 W/ 250 OVERRIDE (IP): Performed by: INTERNAL MEDICINE

## 2018-11-14 PROCEDURE — 70450 CT HEAD/BRAIN W/O DYE: CPT

## 2018-11-14 PROCEDURE — A9270 NON-COVERED ITEM OR SERVICE: HCPCS | Performed by: INTERNAL MEDICINE

## 2018-11-14 PROCEDURE — 700102 HCHG RX REV CODE 250 W/ 637 OVERRIDE(OP): Performed by: EMERGENCY MEDICINE

## 2018-11-14 PROCEDURE — 85025 COMPLETE CBC W/AUTO DIFF WBC: CPT

## 2018-11-14 PROCEDURE — 99219 PR INITIAL OBSERVATION CARE,LEVL II: CPT | Performed by: INTERNAL MEDICINE

## 2018-11-14 PROCEDURE — A9270 NON-COVERED ITEM OR SERVICE: HCPCS

## 2018-11-14 PROCEDURE — 700102 HCHG RX REV CODE 250 W/ 637 OVERRIDE(OP)

## 2018-11-14 PROCEDURE — 700105 HCHG RX REV CODE 258: Performed by: INTERNAL MEDICINE

## 2018-11-14 PROCEDURE — 700111 HCHG RX REV CODE 636 W/ 250 OVERRIDE (IP)

## 2018-11-14 PROCEDURE — 80053 COMPREHEN METABOLIC PANEL: CPT

## 2018-11-14 PROCEDURE — 700117 HCHG RX CONTRAST REV CODE 255: Performed by: EMERGENCY MEDICINE

## 2018-11-14 PROCEDURE — 700102 HCHG RX REV CODE 250 W/ 637 OVERRIDE(OP): Performed by: INTERNAL MEDICINE

## 2018-11-14 PROCEDURE — 84703 CHORIONIC GONADOTROPIN ASSAY: CPT

## 2018-11-14 PROCEDURE — 99285 EMERGENCY DEPT VISIT HI MDM: CPT

## 2018-11-14 PROCEDURE — 70551 MRI BRAIN STEM W/O DYE: CPT

## 2018-11-14 PROCEDURE — 70496 CT ANGIOGRAPHY HEAD: CPT

## 2018-11-14 PROCEDURE — 96374 THER/PROPH/DIAG INJ IV PUSH: CPT

## 2018-11-14 RX ORDER — ONDANSETRON 2 MG/ML
4 INJECTION INTRAMUSCULAR; INTRAVENOUS EVERY 4 HOURS PRN
Status: DISCONTINUED | OUTPATIENT
Start: 2018-11-14 | End: 2018-11-15 | Stop reason: HOSPADM

## 2018-11-14 RX ORDER — ASPIRIN 325 MG
325 TABLET ORAL DAILY
Status: DISCONTINUED | OUTPATIENT
Start: 2018-11-14 | End: 2018-11-15 | Stop reason: HOSPADM

## 2018-11-14 RX ORDER — POTASSIUM CHLORIDE 20 MEQ/1
40 TABLET, EXTENDED RELEASE ORAL EVERY 4 HOURS
Status: COMPLETED | OUTPATIENT
Start: 2018-11-14 | End: 2018-11-14

## 2018-11-14 RX ORDER — SODIUM CHLORIDE, SODIUM LACTATE, POTASSIUM CHLORIDE, CALCIUM CHLORIDE 600; 310; 30; 20 MG/100ML; MG/100ML; MG/100ML; MG/100ML
INJECTION, SOLUTION INTRAVENOUS CONTINUOUS
Status: DISCONTINUED | OUTPATIENT
Start: 2018-11-14 | End: 2018-11-15 | Stop reason: HOSPADM

## 2018-11-14 RX ORDER — HYDRALAZINE HYDROCHLORIDE 20 MG/ML
10 INJECTION INTRAMUSCULAR; INTRAVENOUS
Status: DISCONTINUED | OUTPATIENT
Start: 2018-11-14 | End: 2018-11-15 | Stop reason: HOSPADM

## 2018-11-14 RX ORDER — ONDANSETRON 2 MG/ML
4 INJECTION INTRAMUSCULAR; INTRAVENOUS ONCE
Status: COMPLETED | OUTPATIENT
Start: 2018-11-14 | End: 2018-11-14

## 2018-11-14 RX ORDER — ONDANSETRON 2 MG/ML
INJECTION INTRAMUSCULAR; INTRAVENOUS
Status: COMPLETED
Start: 2018-11-14 | End: 2018-11-14

## 2018-11-14 RX ORDER — ONDANSETRON 4 MG/1
4 TABLET, ORALLY DISINTEGRATING ORAL EVERY 4 HOURS PRN
Status: DISCONTINUED | OUTPATIENT
Start: 2018-11-14 | End: 2018-11-15 | Stop reason: HOSPADM

## 2018-11-14 RX ORDER — DIAZEPAM 5 MG/1
10 TABLET ORAL NIGHTLY
Status: COMPLETED | OUTPATIENT
Start: 2018-11-14 | End: 2018-11-14

## 2018-11-14 RX ORDER — MECLIZINE HYDROCHLORIDE 25 MG/1
25 TABLET ORAL 3 TIMES DAILY PRN
Status: DISCONTINUED | OUTPATIENT
Start: 2018-11-14 | End: 2018-11-15 | Stop reason: HOSPADM

## 2018-11-14 RX ORDER — MECLIZINE HYDROCHLORIDE 25 MG/1
25 TABLET ORAL ONCE
Status: COMPLETED | OUTPATIENT
Start: 2018-11-14 | End: 2018-11-14

## 2018-11-14 RX ORDER — ACETAMINOPHEN 325 MG/1
650 TABLET ORAL EVERY 6 HOURS PRN
Status: DISCONTINUED | OUTPATIENT
Start: 2018-11-14 | End: 2018-11-15 | Stop reason: HOSPADM

## 2018-11-14 RX ORDER — LORAZEPAM 1 MG/1
0.5 TABLET ORAL ONCE
Status: COMPLETED | OUTPATIENT
Start: 2018-11-14 | End: 2018-11-14

## 2018-11-14 RX ORDER — ASPIRIN 300 MG/1
300 SUPPOSITORY RECTAL DAILY
Status: DISCONTINUED | OUTPATIENT
Start: 2018-11-14 | End: 2018-11-15 | Stop reason: HOSPADM

## 2018-11-14 RX ORDER — ASPIRIN 81 MG/1
324 TABLET, CHEWABLE ORAL DAILY
Status: DISCONTINUED | OUTPATIENT
Start: 2018-11-14 | End: 2018-11-15 | Stop reason: HOSPADM

## 2018-11-14 RX ORDER — SODIUM CHLORIDE 9 MG/ML
1000 INJECTION, SOLUTION INTRAVENOUS ONCE
Status: COMPLETED | OUTPATIENT
Start: 2018-11-14 | End: 2018-11-14

## 2018-11-14 RX ORDER — DIAZEPAM 2 MG/1
1 TABLET ORAL ONCE
Status: DISCONTINUED | OUTPATIENT
Start: 2018-11-14 | End: 2018-11-14

## 2018-11-14 RX ORDER — MECLIZINE HYDROCHLORIDE 25 MG/1
TABLET ORAL
Status: COMPLETED
Start: 2018-11-14 | End: 2018-11-14

## 2018-11-14 RX ORDER — LABETALOL HYDROCHLORIDE 5 MG/ML
10 INJECTION, SOLUTION INTRAVENOUS EVERY 4 HOURS PRN
Status: DISCONTINUED | OUTPATIENT
Start: 2018-11-14 | End: 2018-11-15 | Stop reason: HOSPADM

## 2018-11-14 RX ADMIN — ONDANSETRON HYDROCHLORIDE 4 MG: 2 INJECTION, SOLUTION INTRAMUSCULAR; INTRAVENOUS at 08:45

## 2018-11-14 RX ADMIN — SODIUM CHLORIDE 1000 ML: 9 INJECTION, SOLUTION INTRAVENOUS at 14:45

## 2018-11-14 RX ADMIN — MECLIZINE HYDROCHLORIDE 25 MG: 25 TABLET ORAL at 08:45

## 2018-11-14 RX ADMIN — ONDANSETRON 4 MG: 2 INJECTION INTRAMUSCULAR; INTRAVENOUS at 14:45

## 2018-11-14 RX ADMIN — SODIUM CHLORIDE, POTASSIUM CHLORIDE, SODIUM LACTATE AND CALCIUM CHLORIDE: 600; 310; 30; 20 INJECTION, SOLUTION INTRAVENOUS at 18:01

## 2018-11-14 RX ADMIN — POTASSIUM CHLORIDE 40 MEQ: 1500 TABLET, EXTENDED RELEASE ORAL at 14:45

## 2018-11-14 RX ADMIN — ONDANSETRON 4 MG: 2 INJECTION INTRAMUSCULAR; INTRAVENOUS at 08:45

## 2018-11-14 RX ADMIN — LORAZEPAM 0.5 MG: 1 TABLET ORAL at 12:48

## 2018-11-14 RX ADMIN — MECLIZINE HYDROCHLORIDE 25 MG: 25 TABLET ORAL at 14:47

## 2018-11-14 RX ADMIN — MECLIZINE HYDROCHLORIDE 25 MG: 25 TABLET ORAL at 21:42

## 2018-11-14 RX ADMIN — DIAZEPAM 10 MG: 5 TABLET ORAL at 21:42

## 2018-11-14 RX ADMIN — IOHEXOL 100 ML: 350 INJECTION, SOLUTION INTRAVENOUS at 11:30

## 2018-11-14 RX ADMIN — POTASSIUM CHLORIDE 40 MEQ: 1500 TABLET, EXTENDED RELEASE ORAL at 18:25

## 2018-11-14 ASSESSMENT — ENCOUNTER SYMPTOMS
ABDOMINAL PAIN: 0
LOSS OF CONSCIOUSNESS: 0
SPEECH CHANGE: 0
WHEEZING: 0
TREMORS: 0
SEIZURES: 0
ORTHOPNEA: 0
EYE PAIN: 0
CONSTIPATION: 0
BLOOD IN STOOL: 0
DIAPHORESIS: 0
HEADACHES: 0
MYALGIAS: 0
SENSORY CHANGE: 0
DEPRESSION: 0
CHILLS: 0
HALLUCINATIONS: 0
CLAUDICATION: 0
EYE REDNESS: 0
FALLS: 0
PHOTOPHOBIA: 0
FEVER: 0
HEARTBURN: 0
FOCAL WEAKNESS: 0
FLANK PAIN: 0
VOMITING: 1
PND: 0
COUGH: 0
DIZZINESS: 1
TINGLING: 0
WEIGHT LOSS: 0
DIARRHEA: 0
NAUSEA: 1
SHORTNESS OF BREATH: 0
BLURRED VISION: 0
SPUTUM PRODUCTION: 0
INSOMNIA: 0
HEMOPTYSIS: 0
NERVOUS/ANXIOUS: 0
BACK PAIN: 0
SINUS PAIN: 0
EYE DISCHARGE: 0
DOUBLE VISION: 0
PALPITATIONS: 0
MEMORY LOSS: 0
STRIDOR: 0
SORE THROAT: 0
NECK PAIN: 0

## 2018-11-14 ASSESSMENT — PATIENT HEALTH QUESTIONNAIRE - PHQ9
1. LITTLE INTEREST OR PLEASURE IN DOING THINGS: NOT AT ALL
2. FEELING DOWN, DEPRESSED, IRRITABLE, OR HOPELESS: NOT AT ALL
SUM OF ALL RESPONSES TO PHQ9 QUESTIONS 1 AND 2: 0

## 2018-11-14 ASSESSMENT — LIFESTYLE VARIABLES
ALCOHOL_USE: NO
EVER_SMOKED: NEVER
EVER_SMOKED: NEVER
SUBSTANCE_ABUSE: 0

## 2018-11-14 ASSESSMENT — COPD QUESTIONNAIRES
COPD SCREENING SCORE: 0
HAVE YOU SMOKED AT LEAST 100 CIGARETTES IN YOUR ENTIRE LIFE: NO/DON'T KNOW
DO YOU EVER COUGH UP ANY MUCUS OR PHLEGM?: NO/ONLY WITH OCCASIONAL COLDS OR INFECTIONS
DURING THE PAST 4 WEEKS HOW MUCH DID YOU FEEL SHORT OF BREATH: NONE/LITTLE OF THE TIME

## 2018-11-14 ASSESSMENT — PAIN SCALES - GENERAL: PAINLEVEL_OUTOF10: 0

## 2018-11-14 NOTE — ED PROVIDER NOTES
ED Provider Note    This patient encounter occurred during a period of Epic downtime.  Please see nursing documentation and paper documentation for accurate times, and additional information that may not be present in the electronic medical record.    Chief Complaint:   Vomiting, disequilibrium    HPI:  Flores Arias is a 43 y.o. female who presents with symptoms of vertigo.  Symptoms began around 530 this morning, she describes a sensation that the room is spinning.  This persistent since time of onset, neither worsening nor improving.  She does have associated nausea and vomiting that is also been persistent since time of onset.  She denies tinnitus, denies chest pain, denies shortness of breath.  She has no previous similar symptoms.  She denies any vision changes.  She has had no recent fevers.  Symptoms are exacerbated by sitting up and walking around, no alleviating factors.  She does not have any weakness in the arms or legs, though again ambulation does worsen her symptoms.    She has no significant past medical history, no history of cardiac disease, no CVA risk factors.  She has no history of hypertension, no history of diabetes.  She is not currently taking any medications, she is on no anticoagulation or antiplatelet agents.    Review of Systems:  See HPI for pertinent positives and negatives. All other systems negative.    Past Medical History:       Social History:  Social History     Social History Main Topics   • Smoking status: Never Smoker   • Smokeless tobacco: Never Used   • Alcohol use 1.2 oz/week     2 Cans of beer per week      Comment:  4 mixed drinks weekly   • Drug use: No   • Sexual activity: Yes     Partners: Male       Surgical History:   has a past surgical history that includes breast biopsy (2015) and appendectomy laparoscopic (N/A, 12/6/2016).    Current Medications:  Home Medications     Reviewed by Rohna Zhang R.N. (Registered Nurse) on 11/14/18 at 1119  Med List Status:  Unable to Obtain   Medication Last Dose Status   ibuprofen (MOTRIN) 200 MG Tab > Month Active   norgestimate-ethinyl estradiol (ORTHO-CYCLEN) 0.25-35 MG-MCG per tablet 8/15/2018 Active   omeprazole (PRILOSEC) 20 MG delayed-release capsule  Active   therapeutic multivitamin-minerals (THERAGRAN-M) Tab 8/15/2018 Active                Allergies:  No Known Allergies    Physical Exam:  Vital Signs: /75   Pulse 81   Temp 36.1 °C (97 °F)   Resp 14   Wt 54.8 kg (120 lb 13 oz)   LMP 11/01/2018   SpO2 100%   BMI 22.10 kg/m²   Constitutional: Alert, no acute distress  HENT: Moist mucus membranes, normal posterior pharynx, no intraoral lesions  Eyes: Pupils equal and reactive, normal conjunctiva  Neck: Supple, normal range of motion, no stridor  Cardiovascular: Extremities are warm and well perfused, no murmur appreciated, normal cardiac auscultation  Pulmonary: No respiratory distress, normal work of breathing, no accessory muscule usage, breath sounds clear and equal bilaterally  Abdomen: Soft, non-distended, non-tender to palpation, no peritoneal signs  Skin: Warm, dry, no rashes or lesions  Musculoskeletal: Normal range of motion in all extremities, no swelling or deformity noted  Neurologic: CN II-XII intact, speech normal, muscle strength 5/5 in all four extremities, normal  strength bilaterally, sensation grossly intact, normal finger-to-nose, no pronator drift, no nystagmus, hints exam not suggestive of a central process, though head impulse testing is limited by patient vomiting and difficulty participating in exam.  Psychiatric: Normal and appropriate mood and affect    Medical records reviewed for continuity of care.  No recent visits for similar symptoms.    EKG: Rate 61, normal sinus rhythm, no ST elevation or depression, no T wave inversions, no ectopy.    Labs:  Labs Reviewed   ESTIMATED GFR       Radiology:  CT-HEAD W/O    (Results Pending)   CT-CTA HEAD WITH & W/O-POST PROCESS    (Results  Pending)   CT-CTA NECK WITH & W/O-POST PROCESSING    (Results Pending)        ED Medications Administered:  Medications   ondansetron (ZOFRAN) syringe/vial injection 4 mg (4 mg Intravenous Given 11/14/18 0845)   meclizine (ANTIVERT) tablet 25 mg (25 mg Oral Given 11/14/18 0845)       Differential diagnosis:  Vertigo, posterior circulation CVA, electrolyte abnormality    MDM:  History and physical exam as documented above.  Patient presents with new onset symptoms of vertigo.  History and risk factors are suggestive of peripheral etiology.  She was treated with meclizine and Zofran in the emergency department.    On laboratory evaluation hemoglobin is 10.1, hemoglobin 11.6, hematocrit 36.4, platelet count 323.  Sodium 136, potassium 3.2, chloride 103, CO2 22, anion gap 11, glucose 193, BUN 8, creatinine 0.63.  HCG is negative.  Liver enzymes within normal limits.    On laboratory evaluation White blood count is 10.1.  Hemoglobin 11.6, hematocrit 36.4, platelet count 323.    CT and CTA are negative for acute intracranial process on preliminary read.    On my reassessment, she continues to have mild symptoms of vertigo, though her nausea and vomiting have resolved.  She is able to ambulate without assistance, however is concerned when she does so due to her sensation of disequilibrium.  Plan at this time is for admission for continued symptomatic management and MRI to further rule out posterior circulation CVA.    I discussed the case with [facility] hospitalist.  He kindly agrees to admit the patient, requests MRI ordered from the emergency department.  He currently agrees to follow-up results.    Disposition:  Admit to hospitalist in guarded condition    Final Impression:  Vertigo  Nausea and vomiting    Electronically signed by: Gabi Grubbs, 11/14/2018 5:03 PM

## 2018-11-14 NOTE — H&P
Hospital Medicine History & Physical Note    Date of Service  11/14/2018    Primary Care Physician  YAA Jauregui    Consultants  None    Code Status  FULL CODE     Chief Complaint  Vertigo     History of Presenting Illness  43 y.o. female who presented 11/14/2018 with Vertigo.     No significant prior medical history.     Presenting to the ED with vertigo. In baseline level of health until this am. Was using phone when hit with a sudden wave of vertigo with her body spinning around / room spinning around c/b severe lightheadedness & weakness followed by nausea and vomiting and persistent dry heaves. Persistent vertigo prompting to come to the emergency department for further evaluation.  Alongside the symptoms, she denies any other neurological deficits.  No headaches.  No visual concerns.  No motor weakness.  No sensory deficits.  Felt very weak and lethargic.  Otherwise no chest pain, shortness of breath.  No fever or chills.  No other complaints.  No seizure-like symptoms.  Persistent symptoms upon moving head and with ambulation.  To note she reports that she has been recovering from a cold/flulike illness.  Previously she had lethargic, myalgia, runny nose and stuffiness which she had been recovering from.  No other sick contacts in the family.  No personal history of stroke or history of stroke in the family.    WBC of 10.1, hemoglobin of 11.6, platelet count of 323, sodium 136, potassium 3.2, BUN of 8, creatinine of 0.63, normal LFTs, negative pregnancy testing  EKG obtained on presentation reveals normal sinus rhythm without any acute ST-T wave changes concerning for acute ischemia or infarction no abnormal rhythms noted on EKG    Review of Systems  Review of Systems   Constitutional: Positive for malaise/fatigue. Negative for chills, diaphoresis, fever and weight loss.   HENT: Negative for congestion, ear discharge, ear pain, hearing loss, nosebleeds, sinus pain, sore throat and tinnitus.     Eyes: Negative for blurred vision, double vision, photophobia, pain, discharge and redness.   Respiratory: Negative for cough, hemoptysis, sputum production, shortness of breath, wheezing and stridor.    Cardiovascular: Negative for chest pain, palpitations, orthopnea, claudication, leg swelling and PND.   Gastrointestinal: Positive for nausea and vomiting. Negative for abdominal pain, blood in stool, constipation, diarrhea, heartburn and melena.   Genitourinary: Negative for dysuria, flank pain, frequency, hematuria and urgency.   Musculoskeletal: Negative for back pain, falls, joint pain, myalgias and neck pain.   Skin: Negative for itching and rash.   Neurological: Positive for dizziness. Negative for tingling, tremors, sensory change, speech change, focal weakness, seizures, loss of consciousness and headaches.   Psychiatric/Behavioral: Negative for depression, hallucinations, memory loss, substance abuse and suicidal ideas. The patient is not nervous/anxious and does not have insomnia.        Past Medical History   has no past medical history of ASTHMA or Diabetes. Negative     Surgical History   has a past surgical history that includes breast biopsy (2015) and appendectomy laparoscopic (N/A, 12/6/2016).     Family History  family history includes Alcohol/Drug in her brother; Diabetes in her father and mother; Heart Disease in her father; Hypertension in her father.     Social History   reports that she has never smoked. She has never used smokeless tobacco. She reports that she drinks about 1.2 oz of alcohol per week . She reports that she does not use drugs.    Allergies  No Known Allergies    Medications  None       Physical Exam  Temp:  [36.1 °C (97 °F)-36.7 °C (98.1 °F)] 36.7 °C (98.1 °F)  Pulse:  [65-89] 72  Resp:  [10-20] 10  BP: (113-130)/(62-80) 122/65    Physical Exam   Constitutional: She is oriented to person, place, and time. She appears well-developed and well-nourished. No distress.   HENT:    Head: Normocephalic.   Mouth/Throat: Oropharynx is clear and moist. No oropharyngeal exudate.   Eyes: Pupils are equal, round, and reactive to light. Conjunctivae are normal. No scleral icterus.   Neck: Normal range of motion. No JVD present.   Cardiovascular: Normal rate, regular rhythm and normal heart sounds.  Exam reveals no gallop and no friction rub.    No murmur heard.  Pulmonary/Chest: Breath sounds normal. No stridor. No respiratory distress. She has no wheezes. She has no rales. She exhibits no tenderness.   Abdominal: Soft. Bowel sounds are normal. She exhibits no distension. There is no tenderness. There is no rebound and no guarding.   Musculoskeletal: Normal range of motion. She exhibits no edema, tenderness or deformity.   Neurological: She is alert and oriented to person, place, and time. No cranial nerve deficit.   CN intact. Nystagmus noted. Fast component to R side. Power 5/5 in b/l UE and LE. No sensory deficits. Tone normal. No focal deficits noted. DTR normal. Vertigo / nystagmus with head movement noted.    Skin: Skin is warm and dry. She is not diaphoretic.   Psychiatric: She has a normal mood and affect. Her behavior is normal. Judgment and thought content normal.       Laboratory:          No results for input(s): ALTSGPT, ASTSGOT, ALKPHOSPHAT, TBILIRUBIN, DBILIRUBIN, GAMMAGT, AMYLASE, LIPASE, ALB, PREALBUMIN, GLUCOSE in the last 72 hours.              No results for input(s): TROPONINI in the last 72 hours.    Urinalysis:    No results found     Imaging:  CT-HEAD W/O    (Results Pending)   CT-CTA HEAD WITH & W/O-POST PROCESS    (Results Pending)   CT-CTA NECK WITH & W/O-POST PROCESSING    (Results Pending)   MR-BRAIN-W/O    (Results Pending)         Assessment/Plan:  I anticipate this patient is appropriate for observation status at this time.    Vertigo- (present on admission)   Assessment & Plan    Suspect this is viral labyrinthitis / peripheral vertigo   Per verbal report from   [facility], CT / CTA Head and neck negative (no formal report given EPIC downtime)    Admit to CDU for observation   MRI brain to r/o posterior circulation stroke   Stroke protocol     Meclizine as needed  HS diazepam   IVF hydration   PT evaluation      Hypokalemia- (present on admission)   Assessment & Plan    Replaced         VTE prophylaxis: SCD and SC Lovenox

## 2018-11-14 NOTE — PROGRESS NOTES
Seen pt, AOx 4. On cardiac monitor with SR. Still dizzy and worsens with movement, and in turn nausea a nd vomiting (vomited x1). Fluids on LR at 125 ml/hr. Plan of care discussed includes Safety, labs, control dizziness/ N/V, MRI  and pt understands.

## 2018-11-14 NOTE — DISCHARGE PLANNING
Care Transition Team Assessment    Spoke with patient at bedside. Anticipate no needs @ present time.    Information Source  Orientation : Oriented x 4  Information Given By: Patient    Readmission Evaluation  Is this a readmission?: No    Interdisciplinary Discharge Planning  Does Admitting Nurse Feel This Could be a Complex Discharge?: No  Primary Care Physician: Adrianne Willis  Lives with - Patient's Self Care Capacity: Spouse  Support Systems: Family Member(s), Spouse / Significant Other  Do You Take your Prescribed Medications Regularly: No  Able to Return to Previous ADL's: Yes  Mobility Issues: No  Assistance Needed: No  Durable Medical Equipment: Not Applicable    Anticipated Discharge Information  Anticipated discharge disposition: Home  Discharge Address: [address] [facility]  Discharge Contact Phone Number: [phone]

## 2018-11-14 NOTE — ED NOTES
Downtime entry for Moira RN:  08:20 Pt ambulates slowly but steadily to restroom feeling like room spinning.  0830: to ct  0900: Medicated per Mar, pt resting w/ eyes closed,resp even/unlabored,nad noted.  0945: Pt states room spinning has resolved but nausea remains

## 2018-11-14 NOTE — ASSESSMENT & PLAN NOTE
Suspect this is viral labyrinthitis / peripheral vertigo   Per verbal report from Dr Grubbs, CT / CTA Head and neck negative (no formal report given EPIC downtime)    Admit to CDU for observation   MRI brain to r/o posterior circulation stroke   Stroke protocol     Meclizine as needed  HS diazepam   IVF hydration   PT evaluation

## 2018-11-14 NOTE — ED NOTES
Med rec complete per pt at bedside  Ok per Pt to discuss medications with visitor/s present  Allergies reviewed   No ABX within the last 30 days    Pt denies taking any Rx's or OTC medications

## 2018-11-14 NOTE — ED NOTES
Medicated per MAR. Informed pt on plan for possible CDU admit per MD, verbalizes understanding. Spouse at bedside

## 2018-11-15 ENCOUNTER — APPOINTMENT (OUTPATIENT)
Dept: RADIOLOGY | Facility: MEDICAL CENTER | Age: 43
End: 2018-11-15
Attending: INTERNAL MEDICINE
Payer: COMMERCIAL

## 2018-11-15 ENCOUNTER — PATIENT OUTREACH (OUTPATIENT)
Dept: HEALTH INFORMATION MANAGEMENT | Facility: OTHER | Age: 43
End: 2018-11-15

## 2018-11-15 VITALS
BODY MASS INDEX: 22.23 KG/M2 | TEMPERATURE: 97.2 F | DIASTOLIC BLOOD PRESSURE: 61 MMHG | HEART RATE: 86 BPM | WEIGHT: 120.81 LBS | RESPIRATION RATE: 18 BRPM | HEIGHT: 62 IN | SYSTOLIC BLOOD PRESSURE: 110 MMHG | OXYGEN SATURATION: 97 %

## 2018-11-15 PROBLEM — E87.6 HYPOKALEMIA: Status: RESOLVED | Noted: 2018-11-14 | Resolved: 2018-11-15

## 2018-11-15 PROBLEM — Q76.49 VERTEBRAL ANOMALY: Status: ACTIVE | Noted: 2018-11-15

## 2018-11-15 LAB
ALBUMIN SERPL BCP-MCNC: 3 G/DL (ref 3.2–4.9)
ALBUMIN/GLOB SERPL: 1.3 G/DL
ALP SERPL-CCNC: 34 U/L (ref 30–99)
ALT SERPL-CCNC: 7 U/L (ref 2–50)
ANION GAP SERPL CALC-SCNC: 7 MMOL/L (ref 0–11.9)
AST SERPL-CCNC: 10 U/L (ref 12–45)
BILIRUB SERPL-MCNC: 0.3 MG/DL (ref 0.1–1.5)
BUN SERPL-MCNC: 8 MG/DL (ref 8–22)
CALCIUM SERPL-MCNC: 8.1 MG/DL (ref 8.5–10.5)
CHLORIDE SERPL-SCNC: 111 MMOL/L (ref 96–112)
CO2 SERPL-SCNC: 20 MMOL/L (ref 20–33)
CREAT SERPL-MCNC: 0.61 MG/DL (ref 0.5–1.4)
ERYTHROCYTE [DISTWIDTH] IN BLOOD BY AUTOMATED COUNT: 44.5 FL (ref 35.9–50)
GLOBULIN SER CALC-MCNC: 2.3 G/DL (ref 1.9–3.5)
GLUCOSE SERPL-MCNC: 89 MG/DL (ref 65–99)
HCT VFR BLD AUTO: 36 % (ref 37–47)
HGB BLD-MCNC: 11.1 G/DL (ref 12–16)
MCH RBC QN AUTO: 26.2 PG (ref 27–33)
MCHC RBC AUTO-ENTMCNC: 30.8 G/DL (ref 33.6–35)
MCV RBC AUTO: 84.9 FL (ref 81.4–97.8)
PLATELET # BLD AUTO: 299 K/UL (ref 164–446)
PMV BLD AUTO: 11.2 FL (ref 9–12.9)
POTASSIUM SERPL-SCNC: 4.5 MMOL/L (ref 3.6–5.5)
PROT SERPL-MCNC: 5.3 G/DL (ref 6–8.2)
RBC # BLD AUTO: 4.24 M/UL (ref 4.2–5.4)
SODIUM SERPL-SCNC: 138 MMOL/L (ref 135–145)
WBC # BLD AUTO: 6.3 K/UL (ref 4.8–10.8)

## 2018-11-15 PROCEDURE — 80053 COMPREHEN METABOLIC PANEL: CPT

## 2018-11-15 PROCEDURE — A9270 NON-COVERED ITEM OR SERVICE: HCPCS | Performed by: INTERNAL MEDICINE

## 2018-11-15 PROCEDURE — G8979 MOBILITY GOAL STATUS: HCPCS | Mod: CI

## 2018-11-15 PROCEDURE — 700111 HCHG RX REV CODE 636 W/ 250 OVERRIDE (IP): Performed by: INTERNAL MEDICINE

## 2018-11-15 PROCEDURE — 70547 MR ANGIOGRAPHY NECK W/O DYE: CPT

## 2018-11-15 PROCEDURE — 99217 PR OBSERVATION CARE DISCHARGE: CPT | Performed by: INTERNAL MEDICINE

## 2018-11-15 PROCEDURE — G8978 MOBILITY CURRENT STATUS: HCPCS | Mod: CI

## 2018-11-15 PROCEDURE — 96372 THER/PROPH/DIAG INJ SC/IM: CPT

## 2018-11-15 PROCEDURE — 97161 PT EVAL LOW COMPLEX 20 MIN: CPT

## 2018-11-15 PROCEDURE — 700105 HCHG RX REV CODE 258: Performed by: INTERNAL MEDICINE

## 2018-11-15 PROCEDURE — 85027 COMPLETE CBC AUTOMATED: CPT

## 2018-11-15 PROCEDURE — G0378 HOSPITAL OBSERVATION PER HR: HCPCS

## 2018-11-15 PROCEDURE — 700102 HCHG RX REV CODE 250 W/ 637 OVERRIDE(OP): Performed by: INTERNAL MEDICINE

## 2018-11-15 PROCEDURE — 36415 COLL VENOUS BLD VENIPUNCTURE: CPT

## 2018-11-15 RX ORDER — MECLIZINE HYDROCHLORIDE 25 MG/1
25 TABLET ORAL 3 TIMES DAILY PRN
Qty: 30 TAB | Refills: 0 | Status: SHIPPED | OUTPATIENT
Start: 2018-11-15 | End: 2019-01-23

## 2018-11-15 RX ORDER — ONDANSETRON 4 MG/1
4 TABLET, ORALLY DISINTEGRATING ORAL EVERY 4 HOURS PRN
Qty: 10 TAB | Refills: 0 | Status: SHIPPED | OUTPATIENT
Start: 2018-11-15 | End: 2019-01-23

## 2018-11-15 RX ORDER — DIAZEPAM 5 MG/1
5 TABLET ORAL
Qty: 7 TAB | Refills: 0 | Status: SHIPPED | OUTPATIENT
Start: 2018-11-15 | End: 2018-11-22

## 2018-11-15 RX ADMIN — ENOXAPARIN SODIUM 40 MG: 100 INJECTION SUBCUTANEOUS at 05:14

## 2018-11-15 RX ADMIN — MECLIZINE HYDROCHLORIDE 25 MG: 25 TABLET ORAL at 05:18

## 2018-11-15 RX ADMIN — SODIUM CHLORIDE, POTASSIUM CHLORIDE, SODIUM LACTATE AND CALCIUM CHLORIDE: 600; 310; 30; 20 INJECTION, SOLUTION INTRAVENOUS at 03:22

## 2018-11-15 RX ADMIN — ASPIRIN 325 MG: 325 TABLET, COATED ORAL at 05:14

## 2018-11-15 ASSESSMENT — COGNITIVE AND FUNCTIONAL STATUS - GENERAL
MOBILITY SCORE: 22
SUGGESTED CMS G CODE MODIFIER MOBILITY: CJ
WALKING IN HOSPITAL ROOM: A LITTLE
CLIMB 3 TO 5 STEPS WITH RAILING: A LITTLE

## 2018-11-15 ASSESSMENT — PAIN SCALES - GENERAL
PAINLEVEL_OUTOF10: 0
PAINLEVEL_OUTOF10: 0

## 2018-11-15 ASSESSMENT — GAIT ASSESSMENTS
DEVIATION: DECREASED HEEL STRIKE;DECREASED TOE OFF;BRADYKINETIC
DISTANCE (FEET): 100
GAIT LEVEL OF ASSIST: STAND BY ASSIST

## 2018-11-15 NOTE — CARE PLAN
Problem: Safety  Goal: Will remain free from injury  Outcome: PROGRESSING AS EXPECTED  Place pt call light and belongings within reached, pt calls approriately; Instructed to call for assistance, Risk for fall education implemented; Non-skid socks on when OOB; Bed lowered and locked, upper siderails up.    Problem: Discharge Barriers/Planning  Goal: Patient's continuum of care needs will be met  Outcome: PROGRESSING AS EXPECTED  Admitted with c/o dizziness and nausea. Will be on observation to r/o stroke vs vertigo vs viral infection (labyrinthitis).

## 2018-11-15 NOTE — PROGRESS NOTES
I have personally seen and examined / evaluated the patient, discussed the patient's evaluation & management plan with JOSEPHINE Castro and I have reviewed the note below.     I agree with the findings, history, examination and assessment / plan as listed below with changes/addendum as listed below by me in my addendum / attestation separetely.     Presented with vertigo, negative MRI brain    CTA neck:   Dominant left vertebral artery and significantly diminutive right vertebral artery which may be occluded or stenosed intracranially.  Paranasal sinus disease.    CTA Head:   No thrombosis is seen within the [place].  Significantly diminutive right vertebral artery which may be partially occluded.    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  11/15/18  10:29 AM

## 2018-11-15 NOTE — DISCHARGE SUMMARY
"Discharge Summary    CHIEF COMPLAINT ON ADMISSION  Chief Complaint   Patient presents with   • Dizziness   • Nausea       Reason for Admission  other     Admission Date  11/14/2018  Discharge Date  11/15/18    CODE STATUS  Full Code    HPI & HOSPITAL COURSE  This is a 43 y.o. female here with vertigo.  She was using the phone yesterday when she had a sudden wave of vertigo feeling like the room was spinning associated with severe lightheadedness, weakness, nausea and vomiting.  She reports recent cold/flulike symptoms.  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  Brain MRI was unremarkable.  CTA head/neck showed significantly diminutive right vertebral artery which may be partially occluded or stenosed.  This was discussed with Dr. 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  Case was discussed with Dr. Contreras, neurology, who suggests this is consistent with peripheral vertigo and recommends Valium/conservative measures and outpatient ENT evaluation.  She was treated with IV fluid hydration, Valium and meclizine and had complete resolution of her nausea and was able to tolerate a regular diet without any nausea or vomiting.  She was evaluated by physical therapy who suggests patient may benefit from FWW when she returns home, however patient is not interested in pursuing this at this time she states she is ambulating better than she was at presentation and the dizziness only occurs when she moves her head or \"gets up too fast\".  Lengthy discussion was had with patient and her  at bedside regarding the plan of care.  We recommend patient takes a week off work and medicates with Valium and meclizine.  Patient is frustrated with this recommendation but states she will be compliant.  She is instructed not to drive while taking " Valium.    Therefore, she is discharged in good and stable condition to home with close outpatient follow-up.    FOLLOW UP ITEMS POST DISCHARGE  -Off work times 1 week  -Take Valium as needed once a day for muscle spasms and anxiety.  Do not drive after taking Valium  -Follow-up with PCP as scheduled  -Return to the nearest emergency department or call 911 for worsening symptoms.    DISCHARGE DIAGNOSES  Active Problems:    Vertigo POA: Yes    Severe hypoplasia of right vertebral artery  Resolved Problems:    Hypokalemia POA: Yes      FOLLOW UP  Future Appointments  Date Time Provider Department Center   11/20/2018 11:20 AM YAA Jauregui A.P.R.N.  [address]  [address]  [facility] [id number] [phone]    In 1 week  As needed      MEDICATIONS ON DISCHARGE     Medication List      START taking these medications      Instructions   diazePAM 5 MG Tabs  Commonly known as:  VALIUM   Take 1 Tab by mouth 1 time daily as needed for Anxiety for up to 7 days.  Dose:  5 mg     meclizine 25 MG Tabs  Commonly known as:  ANTIVERT   Take 1 Tab by mouth 3 times a day as needed for Dizziness or Vertigo.  Dose:  25 mg     ondansetron 4 MG Tbdp  Commonly known as:  ZOFRAN ODT   Take 1 Tab by mouth every four hours as needed for Nausea (give PO if no IV route available).  Dose:  4 mg            Allergies  No Known Allergies    DIET  Orders Placed This Encounter   Procedures   • Diet Order Regular     Standing Status:   Standing     Number of Occurrences:   1     Order Specific Question:   Diet:     Answer:   Regular [1]       ACTIVITY  As tolerated.  Weight bearing as tolerated    CONSULTATIONS  NA    PROCEDURES  NA    LABORATORY  Lab Results   Component Value Date    SODIUM 138 11/15/2018    POTASSIUM 4.5 11/15/2018    CHLORIDE 111 11/15/2018    CO2 20 11/15/2018    GLUCOSE 89 11/15/2018    BUN 8 11/15/2018    CREATININE 0.61 11/15/2018    CREATININE 0.8 02/03/2009        Lab  Results   Component Value Date    WBC 6.3 11/15/2018    HEMOGLOBIN 11.1 (L) 11/15/2018    HEMATOCRIT 36.0 (L) 11/15/2018    PLATELETCT 299 11/15/2018        NEGRITA Pritchett.

## 2018-11-15 NOTE — PROGRESS NOTES
Pt back to floor. Tolerated well. Able to move in bed without vomiting but still dizzy and nauseated but pt stated a little bit better than it was.

## 2018-11-15 NOTE — THERAPY
"Physical Therapy Evaluation completed.   Bed Mobility:  Supine to Sit: Independent  Transfers: Sit to Stand: Supervised  Gait: Level Of Assist: Stand by Assist (very light HHA for security) with Will Continue to Assess for Equipment Needs. Possible home with FWW due to pt very guarded.        Plan of Care: Will benefit from Physical Therapy 2 times per week  Discharge Recommendations: Equipment: Will Continue to Assess for Equipment Needs. Post-acute therapy Discharge to home with outpatient or home health for additional skilled therapy services.    See \"Rehab Therapy-Acute\" Patient Summary Report for complete documentation.     "

## 2018-11-15 NOTE — PROGRESS NOTES
Received report from evening RN.  Pt is A/Ox4.  Respirations are even and unlabored on RA.  Monitors applied, VSS.  Pt denies any dizziness at this time.  Plan of care reviewed, verbalized understanding.  Will continue to closely monitor. Call light within reach.

## 2018-11-15 NOTE — PROGRESS NOTES
"Pt states continued dizziness when \"getting up\".  Paged PT/OT.  Will continue to monitor. Call light within reach.   "

## 2018-11-15 NOTE — DISCHARGE INSTRUCTIONS
Dizziness  Dizziness is a common problem. It makes you feel unsteady or lightheaded. You may feel like you are about to pass out (faint). Dizziness can lead to injury if you stumble or fall. Anyone can get dizzy, but dizziness is more common in older adults. This condition can be caused by a number of things, including:  · Medicines.  · Dehydration.  · Illness.  Follow these instructions at home:  Following these instructions may help with your condition:  Eating and drinking  · Drink enough fluid to keep your pee (urine) clear or pale yellow. This helps to keep you from getting dehydrated. Try to drink more clear fluids, such as water.  · Do not drink alcohol.  · Limit how much caffeine you drink or eat if told by your doctor.  · Limit how much salt you drink or eat if told by your doctor.  Activity  · Avoid making quick movements.  ¨ When you stand up from sitting in a chair, steady yourself until you feel okay.  ¨ In the morning, first sit up on the side of the bed. When you feel okay, stand slowly while you hold onto something. Do this until you know that your balance is fine.  · Move your legs often if you need to  one place for a long time. Tighten and relax your muscles in your legs while you are standing.  · Do not drive or use heavy machinery if you feel dizzy.  · Avoid bending down if you feel dizzy. Place items in your home so that they are easy for you to reach without leaning over.  Lifestyle  · Do not use any tobacco products, including cigarettes, chewing tobacco, or electronic cigarettes. If you need help quitting, ask your doctor.  · Try to lower your stress level, such as with yoga or meditation. Talk with your doctor if you need help.  General instructions  · Watch your dizziness for any changes.  · Take medicines only as told by your doctor. Talk with your doctor if you think that your dizziness is caused by a medicine that you are taking.  · Tell a friend or a family member that you are  feeling dizzy. If he or she notices any changes in your behavior, have this person call your doctor.  · Keep all follow-up visits as told by your doctor. This is important.  Contact a doctor if:  · Your dizziness does not go away.  · Your dizziness or light-headedness gets worse.  · You feel sick to your stomach (nauseous).  · You have trouble hearing.  · You have new symptoms.  · You are unsteady on your feet or you feel like the room is spinning.  Get help right away if:  · You throw up (vomit) or have diarrhea and are unable to eat or drink anything.  · You have trouble:  ¨ Talking.  ¨ Walking.  ¨ Swallowing.  ¨ Using your arms, hands, or legs.  · You feel generally weak.  · You are not thinking clearly or you have trouble forming sentences. It may take a friend or family member to notice this.  · You have:  ¨ Chest pain.  ¨ Pain in your belly (abdomen).  ¨ Shortness of breath.  ¨ Sweating.  · Your vision changes.  · You are bleeding.  · You have a headache.  · You have neck pain or a stiff neck.  · You have a fever.  This information is not intended to replace advice given to you by your health care provider. Make sure you discuss any questions you have with your health care provider.  Document Released: 12/06/2012 Document Revised: 05/25/2017 Document Reviewed: 12/14/2015  [employer] Interactive Patient Education © 2017 [employer]  Discharge Instructions    Discharged to home by car with friend. Discharged via wheelchair, hospital escort: Yes.  Special equipment needed: Not Applicable    Be sure to schedule a follow-up appointment with your primary care doctor or any specialists as instructed.     Discharge Plan:   Diet Plan: Discussed  Activity Level: Discussed  Confirmed Follow up Appointment: Appointment Scheduled  Confirmed Symptoms Management: Discussed  Medication Reconciliation Updated: Yes  Influenza Vaccine Indication: Patient Refuses    I understand that a diet low in cholesterol, fat, and sodium is  "recommended for good health. Unless I have been given specific instructions below for another diet, I accept this instruction as my diet prescription.   Other diet:     Special Instructions:   Patient may DC home. Please include her diagnosis of \"severe hypoplastic right vertebral artery\".  Off work x 1 week.  Valium as needed. No driving after taking Valium.  FU with PCP in 5-7 days.  Return to ED or call 911 for worsening symptoms.                · Is patient discharged on Warfarin / Coumadin?   No     Depression / Suicide Risk    As you are discharged from this [facility] facility, it is important to learn how to keep safe from harming yourself.    Recognize the warning signs:  · Abrupt changes in personality, positive or negative- including increase in energy   · Giving away possessions  · Change in eating patterns- significant weight changes-  positive or negative  · Change in sleeping patterns- unable to sleep or sleeping all the time   · Unwillingness or inability to communicate  · Depression  · Unusual sadness, discouragement and loneliness  · Talk of wanting to die  · Neglect of personal appearance   · Rebelliousness- reckless behavior  · Withdrawal from people/activities they love  · Confusion- inability to concentrate     If you or a loved one observes any of these behaviors or has concerns about self-harm, here's what you can do:  · Talk about it- your feelings and reasons for harming yourself  · Remove any means that you might use to hurt yourself (examples: pills, rope, extension cords, firearm)  · Get professional help from the community (Mental Health, Substance Abuse, psychological counseling)  · Do not be alone:Call your Safe Contact- someone whom you trust who will be there for you.  · Call your local CRISIS HOTLINE 914-5250 or 030-412-1468  · Call your local Children's Mobile Crisis Response Team [place] (314) 070-3377 or www.[employer]  · Call the toll free National Suicide " Prevention Hotlines   · National Suicide Prevention Lifeline 691-003-MRWJ (2597)  · National Hope Line Network 800-SUICIDE (777-4380)

## 2018-11-20 ENCOUNTER — OFFICE VISIT (OUTPATIENT)
Dept: MEDICAL GROUP | Facility: MEDICAL CENTER | Age: 43
End: 2018-11-20
Payer: COMMERCIAL

## 2018-11-20 VITALS
HEIGHT: 62 IN | BODY MASS INDEX: 22.08 KG/M2 | HEART RATE: 86 BPM | WEIGHT: 120 LBS | OXYGEN SATURATION: 94 % | DIASTOLIC BLOOD PRESSURE: 80 MMHG | SYSTOLIC BLOOD PRESSURE: 122 MMHG | TEMPERATURE: 97.8 F | RESPIRATION RATE: 16 BRPM

## 2018-11-20 DIAGNOSIS — R42 VERTIGO: ICD-10-CM

## 2018-11-20 PROCEDURE — 99214 OFFICE O/P EST MOD 30 MIN: CPT | Performed by: NURSE PRACTITIONER

## 2018-11-20 NOTE — PROGRESS NOTES
"Subjective:     Chief Complaint   Patient presents with   • Follow-Up     ER [facility] / DIZZINESS / VOMITING / VERTIGO     Flores Arias is a 43 y.o. female here today to follow up on:    Vertigo  Abrupt onset of vertigo prompting recent ER visit  Was lying on her back in bed, looking at her phone. Suddenly had the sensation that the room was spinning. Tried to get up out of bed to \"shake it off\", vertigo worsened and she became nauseated. Started dry heaving.   She eventually presented to ER. MRI brain and neck were normal. She was treated with zofran and meclizine. Symptoms gradually faded, she has had no recurrence of severe vertigo or vomiting but is still feeling unstable at times, worse with head movement. Not quite \"back to normal\"   Denies headache, vision change, otalgia, change in hearing, sinus pressure or congestion       Current medicines (including changes today)  Current Outpatient Prescriptions   Medication Sig Dispense Refill   • meclizine (ANTIVERT) 25 MG Tab Take 1 Tab by mouth 3 times a day as needed for Dizziness or Vertigo. (Patient not taking: Reported on 11/20/2018) 30 Tab 0   • ondansetron (ZOFRAN ODT) 4 MG TABLET DISPERSIBLE Take 1 Tab by mouth every four hours as needed for Nausea (give PO if no IV route available). (Patient not taking: Reported on 11/20/2018) 10 Tab 0   • diazePAM (VALIUM) 5 MG Tab Take 1 Tab by mouth 1 time daily as needed for Anxiety for up to 7 days. (Patient not taking: Reported on 11/20/2018) 7 Tab 0     No current facility-administered medications for this visit.      She  has no past medical history of ASTHMA or Diabetes.    ROS included above     Objective:     Blood pressure 122/80, pulse 86, temperature 36.6 °C (97.8 °F), temperature source Temporal, resp. rate 16, height 1.575 m (5' 2\"), weight 54.4 kg (120 lb), last menstrual period 11/01/2018, SpO2 94 %, not currently breastfeeding. Body mass index is 21.95 kg/m².     Physical Exam:  General: Alert, oriented " in no acute distress.  Eye contact is good, speech is normal, affect calm  HEENT: EOMI, perrl, Oral mucosa pink moist, no lesions. TMs gray with good landmarks bilaterally. No lymphadenopathy.  Lungs: clear to auscultation bilaterally, normal effort, no wheeze/ rhonchi/ rales.  CV: regular rate and rhythm, S1, S2, no murmur  MS: strength 5/5 throughout, normal gait  Ext: no edema, color normal, vascularity normal, temperature normal    Assessment and Plan:   The following treatment plan was discussed   1. Vertigo   severe episode recently with nausea and vomiting, ER records reviewed.  All testing was reassuring and her symptoms have abated at this point.  She is, however, not feeling completely back to normal.  Still slightly imbalanced.  Referred for vestibular therapy and printed instruction for epley maneuver given.  REFERRAL TO PHYSICAL THERAPY Reason for Therapy: Eval/Treat/Report       Followup:as needed         Please note that this dictation was created using voice recognition software. I have worked with consultants from the vendor as well as technical experts from [facility] to optimize the interface. I have made every reasonable attempt to correct obvious errors, but I expect that there are errors of grammar and possibly content that I did not discover before finalizing the note.

## 2018-11-20 NOTE — ASSESSMENT & PLAN NOTE
"Abrupt onset of vertigo prompting recent ER visit  Was lying on her back in bed, looking at her phone. Suddenly had the sensation that the room was spinning. Tried to get up out of bed to \"shake it off\", vertigo worsened and she became nauseated. Started dry heaving.   She eventually presented to ER. MRI brain and neck were normal. She was treated with zofran and meclizine. Symptoms gradually faded, she has had no recurrence of severe vertigo or vomiting but is still feeling unstable at times, worse with head movement. Not quite \"back to normal\"   Denies headache, vision change, otalgia, change in hearing, sinus pressure or congestion  "

## 2018-12-18 ENCOUNTER — TELEPHONE (OUTPATIENT)
Dept: MEDICAL GROUP | Facility: MEDICAL CENTER | Age: 43
End: 2018-12-18

## 2018-12-18 NOTE — TELEPHONE ENCOUNTER
1. Caller Name: Flores Arias                Call Back Number: [phone]     2. Message: Patient called and left voice message about work leave paperwork, it was stated on the paperwork that it was continuous leave and that it was wrong, patient is asking for it to say Intermittent leave instead of continuous. Patient is faxing over new leave paperwork and is asking for this to be fixed..    3. Patient approves office to leave a detailed voicemail/MyChart message: N\A

## 2018-12-18 NOTE — TELEPHONE ENCOUNTER
Patient has been seen for 1 episode in ER 11/14. This does not constitute a chronic health problem and therefore does not meet the criteria required for intermittent leave.  If she should have episodes in the future she should come in for evaluation so that we can reconsider the course of her illness and necessary leave from work. Tasia BURTON

## 2019-01-23 ENCOUNTER — HOSPITAL ENCOUNTER (OUTPATIENT)
Dept: LAB | Facility: MEDICAL CENTER | Age: 44
End: 2019-01-23
Attending: INTERNAL MEDICINE
Payer: COMMERCIAL

## 2019-01-23 ENCOUNTER — HOSPITAL ENCOUNTER (OUTPATIENT)
Facility: MEDICAL CENTER | Age: 44
End: 2019-01-23
Attending: INTERNAL MEDICINE
Payer: COMMERCIAL

## 2019-01-23 ENCOUNTER — OFFICE VISIT (OUTPATIENT)
Dept: MEDICAL GROUP | Facility: MEDICAL CENTER | Age: 44
End: 2019-01-23
Payer: COMMERCIAL

## 2019-01-23 VITALS
TEMPERATURE: 98.1 F | HEIGHT: 62 IN | BODY MASS INDEX: 22.8 KG/M2 | WEIGHT: 123.9 LBS | OXYGEN SATURATION: 97 % | RESPIRATION RATE: 16 BRPM | SYSTOLIC BLOOD PRESSURE: 110 MMHG | HEART RATE: 61 BPM | DIASTOLIC BLOOD PRESSURE: 70 MMHG

## 2019-01-23 DIAGNOSIS — Z11.3 SCREENING FOR STD (SEXUALLY TRANSMITTED DISEASE): Primary | ICD-10-CM

## 2019-01-23 DIAGNOSIS — Z11.3 SCREENING FOR STD (SEXUALLY TRANSMITTED DISEASE): ICD-10-CM

## 2019-01-23 DIAGNOSIS — B37.31 VAGINAL YEAST INFECTION: ICD-10-CM

## 2019-01-23 LAB
HBV CORE AB SERPL QL IA: NEGATIVE
HBV SURFACE AB SERPL IA-ACNC: 5.87 MIU/ML (ref 0–10)
HBV SURFACE AG SER QL: NEGATIVE
HCV AB SER QL: NEGATIVE
HIV 1+2 AB+HIV1 P24 AG SERPL QL IA: NON REACTIVE
TREPONEMA PALLIDUM IGG+IGM AB [PRESENCE] IN SERUM OR PLASMA BY IMMUNOASSAY: NON REACTIVE

## 2019-01-23 PROCEDURE — 87591 N.GONORRHOEAE DNA AMP PROB: CPT

## 2019-01-23 PROCEDURE — 87510 GARDNER VAG DNA DIR PROBE: CPT

## 2019-01-23 PROCEDURE — 87660 TRICHOMONAS VAGIN DIR PROBE: CPT

## 2019-01-23 PROCEDURE — 36415 COLL VENOUS BLD VENIPUNCTURE: CPT

## 2019-01-23 PROCEDURE — 86780 TREPONEMA PALLIDUM: CPT

## 2019-01-23 PROCEDURE — 87389 HIV-1 AG W/HIV-1&-2 AB AG IA: CPT

## 2019-01-23 PROCEDURE — 87491 CHLMYD TRACH DNA AMP PROBE: CPT

## 2019-01-23 PROCEDURE — 86694 HERPES SIMPLEX NES ANTBDY: CPT | Mod: 91

## 2019-01-23 PROCEDURE — 87480 CANDIDA DNA DIR PROBE: CPT

## 2019-01-23 PROCEDURE — 86706 HEP B SURFACE ANTIBODY: CPT

## 2019-01-23 PROCEDURE — 86704 HEP B CORE ANTIBODY TOTAL: CPT

## 2019-01-23 PROCEDURE — 99214 OFFICE O/P EST MOD 30 MIN: CPT | Performed by: INTERNAL MEDICINE

## 2019-01-23 PROCEDURE — 87340 HEPATITIS B SURFACE AG IA: CPT

## 2019-01-23 PROCEDURE — 86803 HEPATITIS C AB TEST: CPT

## 2019-01-23 RX ORDER — FLUCONAZOLE 150 MG/1
150 TABLET ORAL DAILY
Qty: 1 TAB | Refills: 1 | Status: SHIPPED | OUTPATIENT
Start: 2019-01-23 | End: 2019-01-24

## 2019-01-23 RX ORDER — NORGESTIMATE AND ETHINYL ESTRADIOL 0.25-0.035
1 KIT ORAL DAILY
COMMUNITY
End: 2019-12-20

## 2019-01-23 NOTE — PROGRESS NOTES
CC:  The primary encounter diagnosis was Screening for STD (sexually transmitted disease). A diagnosis of Vaginal yeast infection was also pertinent to this visit.    HISTORY OF THE PRESENT ILLNESS: Patient is a 43 y.o. female. This pleasant patient is here today for vaginal discharge/itch.    Patient says since around 1/12/19 she has developed internal vaginal itching sensation, thick white colored discharge.  Initially there was some odor and this has resolved, it was not fishlike.  Overall the symptoms have gotten a little better to stable since onset.  No dysuria, urinary frequency, hematuria, flank pain, fevers or other associated symptoms.  She developed the vaginal symptoms after her partner had an encounter with another person she was not aware of.  Therefore she would like to have a full screening for STDs.  The start of amoxicillin was after her vaginal symptoms began.  She just started her menses today.  Not currently using anything for current symptoms.    Allergies: Patient has no known allergies.    Current Outpatient Prescriptions Ordered in [facility]   Medication Sig Dispense Refill   • AMOXICILLIN PO Take  by mouth.     • norgestimate-ethinyl estradiol (ORTHO-CYCLEN) 0.25-35 MG-MCG per tablet Take 1 Tab by mouth every day.     • fluconazole (DIFLUCAN) 150 MG tablet Take 1 Tab by mouth every day for 1 day. Okay to repeat one week later if symptoms persist 1 Tab 1     No current Epic-ordered facility-administered medications on file.        History reviewed. No pertinent past medical history.    Past Surgical History:   Procedure Laterality Date   • APPENDECTOMY LAPAROSCOPIC N/A 12/6/2016    Procedure: APPENDECTOMY LAPAROSCOPIC;  Surgeon: Pilar Jennings M.D.;  Location: SURGERY [facility];  Service:    • BREAST BIOPSY  2015    left breast, benign biopsy       Social History   Substance Use Topics   • Smoking status: Never Smoker   • Smokeless tobacco: Never Used   • Alcohol use 1.2 oz/week     2  "Cans of beer per week      Comment:  4 mixed drinks weekly       Social History     Social History Narrative   • No narrative on file       Family History   Problem Relation Age of Onset   • Diabetes Mother    • Hypertension Father    • Heart Disease Father         MI age 69   • Diabetes Father    • Alcohol/Drug Brother        ROS:     - Constitutional: Negative for fever, chills, unexpected weight change, and fatigue/generalized weakness.     - HEENT: Negative for headaches, vision changes, hearing changes, ear pain, ear discharge, rhinorrhea, sinus congestion, sore throat, and neck pain.      - Respiratory: Negative for cough, sputum production, chest congestion, dyspnea, wheezing, and crackles.      - Cardiovascular: Negative for chest pain, palpitations, orthopnea, and bilateral lower extremity edema.     - Gastrointestinal: Negative for heartburn, nausea, vomiting, abdominal pain, hematochezia, melena, diarrhea, constipation, and greasy/foul-smelling stools.     - Genitourinary: Negative for dysuria     - Musculoskeletal: Negative for myalgias, back pain, and joint pain.     - Skin: Negative for rash, itching, cyanotic skin color change.     - Neurological: Negative for dizziness, tingling, tremors, focal sensory deficit, focal weakness and headaches.     - Endo/Heme/Allergies: Does not bruise/bleed easily.     - Psychiatric/Behavioral: Negative for depression, suicidal/homicidal ideation and memory loss.            .      Exam: Blood pressure 110/70, pulse 61, temperature 36.7 °C (98.1 °F), temperature source Temporal, resp. rate 16, height 1.575 m (5' 2\"), weight 56.2 kg (123 lb 14.4 oz), last menstrual period 01/23/2019, SpO2 97 %, not currently breastfeeding. Body mass index is 22.66 kg/m².    General: Normal appearing. No distress.  HEENT: Normocephalic.   Skin: Warm and dry.  No obvious lesions.  Musculoskeletal: Normal gait. No extremity cyanosis, clubbing, or edema.  Psych: Normal mood and affect. Alert " and oriented x3. Judgment and insight is normal.    Please note that this dictation was created using voice recognition software. I have made every reasonable attempt to correct obvious errors, but I expect that there are errors of grammar and possibly content that I did not discover before finalizing the note.      Assessment/Plan  1. Screening for STD (sexually transmitted disease)  Patient verbally agrees to HIV screening, and we discussed this is an annual [place] preventive health task force recommendation.  She believes she has had recent hepatitis B vaccination.  Patient very strongly wishes to be checked for herpes simplex virus, she indicates she has never had a prior outbreak.  - T.PALLIDUM AB EIA; Future  - HIV AG/AB COMBO ASSAY SCREENING; Future  - HEP C VIRUS ANTIBODY; Future  - HEP B CORE AB TOTAL; Future  - HEP B SURFACE ANTIGEN; Future  - HEP B SURFACE AB; Future  - VAGINAL PATHOGENS DNA PANEL; Future  - HSV I/II IGG & IGM SERUM; Future  - CHLAMYDIA/GC PCR URINE OR SWAB    2. Vaginal yeast infection  Based on history we will empirically treat.  Deferred visualization today because patient just started her menses.  D/w pt ok to see me on Monday for gynecologic exam if symptoms do not resolve.  - fluconazole (DIFLUCAN) 150 MG tablet; Take 1 Tab by mouth every day for 1 day. Okay to repeat one week later if symptoms persist  Dispense: 1 Tab; Refill: 1

## 2019-01-25 DIAGNOSIS — B96.89 GARDNERELLA VAGINALIS INFECTION: ICD-10-CM

## 2019-01-25 DIAGNOSIS — N76.0 GARDNERELLA VAGINALIS INFECTION: ICD-10-CM

## 2019-01-25 LAB
HSV1+2 IGG SER IA-ACNC: >22.4 IV
HSV1+2 IGM SER IA-ACNC: 0.37 IV

## 2019-01-25 RX ORDER — METRONIDAZOLE 500 MG/1
500 TABLET ORAL 2 TIMES DAILY
Qty: 14 TAB | Refills: 0 | Status: SHIPPED | OUTPATIENT
Start: 2019-01-25 | End: 2019-02-01

## 2019-01-31 ENCOUNTER — APPOINTMENT (OUTPATIENT)
Dept: PHYSICAL THERAPY | Facility: REHABILITATION | Age: 44
End: 2019-01-31
Attending: NURSE PRACTITIONER
Payer: COMMERCIAL

## 2019-02-05 ENCOUNTER — APPOINTMENT (OUTPATIENT)
Dept: PHYSICAL THERAPY | Facility: REHABILITATION | Age: 44
End: 2019-02-05
Attending: NURSE PRACTITIONER
Payer: COMMERCIAL

## 2019-02-07 ENCOUNTER — APPOINTMENT (OUTPATIENT)
Dept: PHYSICAL THERAPY | Facility: REHABILITATION | Age: 44
End: 2019-02-07
Attending: NURSE PRACTITIONER
Payer: COMMERCIAL

## 2019-02-12 ENCOUNTER — APPOINTMENT (OUTPATIENT)
Dept: PHYSICAL THERAPY | Facility: REHABILITATION | Age: 44
End: 2019-02-12
Attending: NURSE PRACTITIONER
Payer: COMMERCIAL

## 2019-02-14 ENCOUNTER — APPOINTMENT (OUTPATIENT)
Dept: PHYSICAL THERAPY | Facility: REHABILITATION | Age: 44
End: 2019-02-14
Attending: NURSE PRACTITIONER
Payer: COMMERCIAL

## 2019-02-19 ENCOUNTER — APPOINTMENT (OUTPATIENT)
Dept: PHYSICAL THERAPY | Facility: REHABILITATION | Age: 44
End: 2019-02-19
Attending: NURSE PRACTITIONER
Payer: COMMERCIAL

## 2019-02-21 ENCOUNTER — APPOINTMENT (OUTPATIENT)
Dept: PHYSICAL THERAPY | Facility: REHABILITATION | Age: 44
End: 2019-02-21
Attending: NURSE PRACTITIONER
Payer: COMMERCIAL

## 2019-03-28 DIAGNOSIS — Z30.09 FAMILY PLANNING: ICD-10-CM

## 2019-06-25 ENCOUNTER — HOSPITAL ENCOUNTER (OUTPATIENT)
Dept: RADIOLOGY | Facility: MEDICAL CENTER | Age: 44
End: 2019-06-25
Attending: NURSE PRACTITIONER
Payer: COMMERCIAL

## 2019-06-25 DIAGNOSIS — Z12.39 SCREENING BREAST EXAMINATION: ICD-10-CM

## 2019-06-25 PROCEDURE — 77063 BREAST TOMOSYNTHESIS BI: CPT

## 2019-11-12 DIAGNOSIS — Z30.09 FAMILY PLANNING: ICD-10-CM

## 2019-12-20 ENCOUNTER — HOSPITAL ENCOUNTER (OUTPATIENT)
Facility: MEDICAL CENTER | Age: 44
End: 2019-12-20
Attending: NURSE PRACTITIONER
Payer: COMMERCIAL

## 2019-12-20 ENCOUNTER — OFFICE VISIT (OUTPATIENT)
Dept: MEDICAL GROUP | Facility: MEDICAL CENTER | Age: 44
End: 2019-12-20
Payer: COMMERCIAL

## 2019-12-20 VITALS
DIASTOLIC BLOOD PRESSURE: 80 MMHG | HEART RATE: 66 BPM | WEIGHT: 130 LBS | OXYGEN SATURATION: 99 % | BODY MASS INDEX: 23.92 KG/M2 | TEMPERATURE: 98.1 F | RESPIRATION RATE: 16 BRPM | SYSTOLIC BLOOD PRESSURE: 118 MMHG | HEIGHT: 62 IN

## 2019-12-20 DIAGNOSIS — Z13.29 THYROID DISORDER SCREEN: ICD-10-CM

## 2019-12-20 DIAGNOSIS — Z11.3 SCREEN FOR STD (SEXUALLY TRANSMITTED DISEASE): ICD-10-CM

## 2019-12-20 DIAGNOSIS — Z23 NEED FOR VACCINATION: ICD-10-CM

## 2019-12-20 DIAGNOSIS — Z30.09 FAMILY PLANNING: ICD-10-CM

## 2019-12-20 DIAGNOSIS — Z12.39 SCREENING FOR BREAST CANCER: ICD-10-CM

## 2019-12-20 DIAGNOSIS — Z00.00 ANNUAL PHYSICAL EXAM: ICD-10-CM

## 2019-12-20 DIAGNOSIS — R73.03 PREDIABETES: ICD-10-CM

## 2019-12-20 DIAGNOSIS — E78.00 PURE HYPERCHOLESTEROLEMIA: ICD-10-CM

## 2019-12-20 PROBLEM — R07.89 ATYPICAL CHEST PAIN: Status: RESOLVED | Noted: 2017-04-24 | Resolved: 2019-12-20

## 2019-12-20 LAB
CANDIDA DNA VAG QL PROBE+SIG AMP: NEGATIVE
G VAGINALIS DNA VAG QL PROBE+SIG AMP: NEGATIVE
T VAGINALIS DNA VAG QL PROBE+SIG AMP: NEGATIVE

## 2019-12-20 PROCEDURE — 90472 IMMUNIZATION ADMIN EACH ADD: CPT | Performed by: NURSE PRACTITIONER

## 2019-12-20 PROCEDURE — 87591 N.GONORRHOEAE DNA AMP PROB: CPT

## 2019-12-20 PROCEDURE — 87491 CHLMYD TRACH DNA AMP PROBE: CPT

## 2019-12-20 PROCEDURE — 87510 GARDNER VAG DNA DIR PROBE: CPT

## 2019-12-20 PROCEDURE — 90686 IIV4 VACC NO PRSV 0.5 ML IM: CPT | Performed by: NURSE PRACTITIONER

## 2019-12-20 PROCEDURE — 90715 TDAP VACCINE 7 YRS/> IM: CPT | Performed by: NURSE PRACTITIONER

## 2019-12-20 PROCEDURE — 99396 PREV VISIT EST AGE 40-64: CPT | Mod: 25 | Performed by: NURSE PRACTITIONER

## 2019-12-20 PROCEDURE — 90471 IMMUNIZATION ADMIN: CPT | Performed by: NURSE PRACTITIONER

## 2019-12-20 PROCEDURE — 87660 TRICHOMONAS VAGIN DIR PROBE: CPT

## 2019-12-20 PROCEDURE — 87480 CANDIDA DNA DIR PROBE: CPT

## 2019-12-20 RX ORDER — NORGESTIMATE AND ETHINYL ESTRADIOL 0.25-0.035
1 KIT ORAL DAILY
Qty: 84 TAB | Refills: 3 | Status: SHIPPED | OUTPATIENT
Start: 2019-12-20 | End: 2020-05-21 | Stop reason: SDUPTHER

## 2019-12-20 ASSESSMENT — PATIENT HEALTH QUESTIONNAIRE - PHQ9: CLINICAL INTERPRETATION OF PHQ2 SCORE: 0

## 2019-12-20 NOTE — ASSESSMENT & PLAN NOTE
A1c in March 2018 6.0, she has not had follow-up on this since that time.  She is watching her diet, admits that she has not been getting much exercise during the winter.  BMI is in good range at 23.7

## 2019-12-20 NOTE — ASSESSMENT & PLAN NOTE
She is doing well on oral contraceptive and would like to continue with this.  She has had an extramarital relationship and would like STD screening, denies any specific concerns.  She is up-to-date on Pap which has been normal

## 2019-12-20 NOTE — PROGRESS NOTES
Chief Complaint   Patient presents with   • Annual Exam   • Medication Refill   • Immunizations     Flores Arias is a 44 y.o. female here for annual exam.  Mammogram due in June.  Up-to-date on Pap.  We discussed:    Prediabetes  A1c in March 2018 6.0, she has not had follow-up on this since that time.  She is watching her diet, admits that she has not been getting much exercise during the winter.  BMI is in good range at 23.7    Family planning  She is doing well on oral contraceptive and would like to continue with this.  She has had an extramarital relationship and would like STD screening, denies any specific concerns.  She is up-to-date on Pap which has been normal    Current medicines (including changes today)  Current Outpatient Medications   Medication Sig Dispense Refill   • norgestimate-ethinyl estradiol (SPRINTEC 28) 0.25-35 MG-MCG per tablet Take 1 Tab by mouth every day. TAKE ONE TABLET BY MOUTH DAILY 84 Tab 3     No current facility-administered medications for this visit.      She  has no past medical history of ASTHMA or Diabetes.  She  has a past surgical history that includes breast biopsy (2015) and appendectomy laparoscopic (N/A, 12/6/2016).  Social History     Tobacco Use   • Smoking status: Never Smoker   • Smokeless tobacco: Never Used   Substance Use Topics   • Alcohol use: Yes     Alcohol/week: 1.2 oz     Types: 2 Cans of beer per week     Comment:  4 mixed drinks weekly   • Drug use: No     Social History     Patient does not qualify to have social determinant information on file (likely too young).   Social History Narrative   • Not on file     Family History   Problem Relation Age of Onset   • Diabetes Mother    • Hypertension Father    • Heart Disease Father         MI age 69   • Diabetes Father    • Alcohol/Drug Brother      Family Status   Relation Name Status   • Mo  Alive   • Fa  Alive   • Bro 2 Alive   • Tomás 1-healthy Alive   • Son 1-healthy Alive         ROS  Problems listed  "discussed above, all other systems reviewed and negative     Objective:     /80 (BP Location: Left arm, Patient Position: Sitting, BP Cuff Size: Adult)   Pulse 66   Temp 36.7 °C (98.1 °F) (Temporal)   Resp 16   Ht 1.575 m (5' 2\")   Wt 59 kg (130 lb)   SpO2 99%  Body mass index is 23.78 kg/m².  Physical Exam:  General: Alert, oriented in no acute distress.  Eye contact is good, speech is normal, affect calm  HEENT: Oral mucosa pink moist, no lesions. Nares patent. TMs gray with good landmarks bilaterally. No cervical or supraclavicular lymphadenopathy  Lungs: clear to auscultation bilaterally, good aeration, normal effort. No wheeze/ rhonchi/ rales.  CV: regular rate and rhythm, S1, S2. No murmur, no JVD, no edema. Pedal pulses 2 + bilaterally  Abdomen: soft, nontender, BS x4, no hepatosplenomegaly.  Ext: color normal, vascularity normal, temperature normal. No rash or lesions.  Neuro: DTR 2+ bilaterally  Assessment and Plan:   The following treatment plan was discussed   1. Annual physical exam  Normal physical exam.  Pap up-to-date, mammogram due in June.  General health and wellness discussion including healthy diet, regular exercise. Advised regular dental cleanings, eye exam yearly.  Lipid Profile    Comp Metabolic Panel    TSH WITH REFLEX TO FT4   2. Family planning   doing well on oral contraceptive, continue current medication  norgestimate-ethinyl estradiol (SPRINTEC 28) 0.25-35 MG-MCG per tablet   3. Prediabetes   last A1c 6.0, update labs.  Encouraged to continue with weight maintenance efforts, healthy diet  HEMOGLOBIN A1C   4. Pure hypercholesterolemia  Lipid Profile   5. Thyroid disorder screen  TSH WITH REFLEX TO FT4   6. Screen for STD (sexually transmitted disease)  Chlamydia/GC PCR Urine Or Swab    VAGINAL PATHOGENS DNA PANEL    HIV AG/AB COMBO ASSAY DIAGNOSTIC    HEP C VIRUS ANTIBODY   7. Need for vaccination  I have placed the below orders and discussed them with an approved delegating " provider. The MA is performing the below orders under the direction of Dr. Srinivasan  Influenza Vaccine Quad Injection (PF)    Tdap =>6yo IM   8. Screening for breast cancer  MA-SCREEN MAMMO W/CAD-BILAT       Followup: pending labs             Please note that this dictation was created using voice recognition software. I have worked with consultants from the vendor as well as technical experts from [facility] to optimize the interface. I have made every reasonable attempt to correct obvious errors, but I expect that there are errors of grammar and possibly content that I did not discover before finalizing the note.

## 2019-12-23 ENCOUNTER — HOSPITAL ENCOUNTER (OUTPATIENT)
Dept: LAB | Facility: MEDICAL CENTER | Age: 44
End: 2019-12-23
Attending: NURSE PRACTITIONER
Payer: COMMERCIAL

## 2019-12-23 DIAGNOSIS — Z00.00 ANNUAL PHYSICAL EXAM: ICD-10-CM

## 2019-12-23 DIAGNOSIS — R73.03 PREDIABETES: ICD-10-CM

## 2019-12-23 DIAGNOSIS — Z11.3 SCREEN FOR STD (SEXUALLY TRANSMITTED DISEASE): ICD-10-CM

## 2019-12-23 DIAGNOSIS — E78.00 PURE HYPERCHOLESTEROLEMIA: ICD-10-CM

## 2019-12-23 DIAGNOSIS — Z13.29 THYROID DISORDER SCREEN: ICD-10-CM

## 2019-12-23 LAB
ALBUMIN SERPL BCP-MCNC: 3.8 G/DL (ref 3.2–4.9)
ALBUMIN/GLOB SERPL: 1.3 G/DL
ALP SERPL-CCNC: 32 U/L (ref 30–99)
ALT SERPL-CCNC: 9 U/L (ref 2–50)
ANION GAP SERPL CALC-SCNC: 8 MMOL/L (ref 0–11.9)
AST SERPL-CCNC: 14 U/L (ref 12–45)
BILIRUB SERPL-MCNC: 0.4 MG/DL (ref 0.1–1.5)
BUN SERPL-MCNC: 14 MG/DL (ref 8–22)
C TRACH DNA SPEC QL NAA+PROBE: NEGATIVE
CALCIUM SERPL-MCNC: 8.8 MG/DL (ref 8.5–10.5)
CHLORIDE SERPL-SCNC: 105 MMOL/L (ref 96–112)
CHOLEST SERPL-MCNC: 196 MG/DL (ref 100–199)
CO2 SERPL-SCNC: 26 MMOL/L (ref 20–33)
CREAT SERPL-MCNC: 0.6 MG/DL (ref 0.5–1.4)
EST. AVERAGE GLUCOSE BLD GHB EST-MCNC: 120 MG/DL
FASTING STATUS PATIENT QL REPORTED: NORMAL
GLOBULIN SER CALC-MCNC: 2.9 G/DL (ref 1.9–3.5)
GLUCOSE SERPL-MCNC: 82 MG/DL (ref 65–99)
HBA1C MFR BLD: 5.8 % (ref 0–5.6)
HCV AB SER QL: NEGATIVE
HDLC SERPL-MCNC: 70 MG/DL
HIV 1+2 AB+HIV1 P24 AG SERPL QL IA: NON REACTIVE
LDLC SERPL CALC-MCNC: 105 MG/DL
N GONORRHOEA DNA SPEC QL NAA+PROBE: NEGATIVE
POTASSIUM SERPL-SCNC: 4 MMOL/L (ref 3.6–5.5)
PROT SERPL-MCNC: 6.7 G/DL (ref 6–8.2)
SODIUM SERPL-SCNC: 139 MMOL/L (ref 135–145)
SPECIMEN SOURCE: NORMAL
TRIGL SERPL-MCNC: 104 MG/DL (ref 0–149)
TSH SERPL DL<=0.005 MIU/L-ACNC: 2.71 UIU/ML (ref 0.38–5.33)

## 2019-12-23 PROCEDURE — 86803 HEPATITIS C AB TEST: CPT

## 2019-12-23 PROCEDURE — 84443 ASSAY THYROID STIM HORMONE: CPT

## 2019-12-23 PROCEDURE — 83036 HEMOGLOBIN GLYCOSYLATED A1C: CPT

## 2019-12-23 PROCEDURE — 80061 LIPID PANEL: CPT

## 2019-12-23 PROCEDURE — 87389 HIV-1 AG W/HIV-1&-2 AB AG IA: CPT

## 2019-12-23 PROCEDURE — 36415 COLL VENOUS BLD VENIPUNCTURE: CPT

## 2019-12-23 PROCEDURE — 80053 COMPREHEN METABOLIC PANEL: CPT

## 2020-02-06 ENCOUNTER — APPOINTMENT (OUTPATIENT)
Dept: MEDICAL GROUP | Facility: MEDICAL CENTER | Age: 45
End: 2020-02-06
Payer: COMMERCIAL

## 2020-02-06 ENCOUNTER — NON-PROVIDER VISIT (OUTPATIENT)
Dept: MEDICAL GROUP | Facility: MEDICAL CENTER | Age: 45
End: 2020-02-06
Payer: COMMERCIAL

## 2020-02-06 DIAGNOSIS — Z78.9 HISTORY OF MEASLES, MUMPS, RUBELLA (MMR) VACCINATION UNKNOWN: ICD-10-CM

## 2020-02-06 DIAGNOSIS — Z23 NEED FOR HEPATITIS A VACCINATION: ICD-10-CM

## 2020-02-06 DIAGNOSIS — Z78.9 UNKNOWN VARICELLA VACCINATION STATUS: ICD-10-CM

## 2020-02-06 PROCEDURE — 90632 HEPA VACCINE ADULT IM: CPT | Performed by: NURSE PRACTITIONER

## 2020-02-06 PROCEDURE — 90471 IMMUNIZATION ADMIN: CPT | Performed by: NURSE PRACTITIONER

## 2020-02-06 NOTE — NON-PROVIDER
"Flores Arias is a 44 y.o. female here for a non-provider visit for:   HEPATITIS A 1 of 1    Reason for immunization: continue or complete series started at the office  Immunization records indicate need for vaccine: Yes, confirmed with Epic  Minimum interval has been met for this vaccine: Yes  ABN completed: Not Indicated    Order and dose verified by: bhanu dubon   VIS Dated  07/20/2016 was given to patient: Yes  All IAC Questionnaire questions were answered \"No.\"    Patient tolerated injection and no adverse effects were observed or reported: Yes    Pt scheduled for next dose in series: No    "

## 2020-05-21 DIAGNOSIS — Z30.09 FAMILY PLANNING: ICD-10-CM

## 2020-05-21 RX ORDER — NORGESTIMATE AND ETHINYL ESTRADIOL 0.25-0.035
1 KIT ORAL DAILY
Qty: 84 TAB | Refills: 3 | Status: SHIPPED | OUTPATIENT
Start: 2020-05-21 | End: 2021-08-26 | Stop reason: SDUPTHER

## 2020-05-29 ENCOUNTER — HOSPITAL ENCOUNTER (OUTPATIENT)
Facility: MEDICAL CENTER | Age: 45
End: 2020-05-29
Payer: COMMERCIAL

## 2020-06-02 LAB
SARS-COV-2 RNA SPEC QL NAA+PROBE: NOT DETECTED
SPECIMEN SOURCE: NORMAL

## 2020-06-17 ENCOUNTER — OFFICE VISIT (OUTPATIENT)
Dept: MEDICAL GROUP | Facility: MEDICAL CENTER | Age: 45
End: 2020-06-17
Payer: COMMERCIAL

## 2020-06-17 VITALS
WEIGHT: 136.69 LBS | HEIGHT: 63 IN | RESPIRATION RATE: 16 BRPM | HEART RATE: 74 BPM | DIASTOLIC BLOOD PRESSURE: 70 MMHG | OXYGEN SATURATION: 96 % | BODY MASS INDEX: 24.22 KG/M2 | TEMPERATURE: 98.7 F | SYSTOLIC BLOOD PRESSURE: 124 MMHG

## 2020-06-17 DIAGNOSIS — E78.00 PURE HYPERCHOLESTEROLEMIA: ICD-10-CM

## 2020-06-17 DIAGNOSIS — Z13.1 SCREENING FOR DIABETES MELLITUS: ICD-10-CM

## 2020-06-17 DIAGNOSIS — R73.01 ELEVATED FASTING GLUCOSE: ICD-10-CM

## 2020-06-17 DIAGNOSIS — Z63.79 STRESSFUL LIFE EVENT AFFECTING FAMILY: ICD-10-CM

## 2020-06-17 DIAGNOSIS — Z02.6 ENCOUNTER FOR EXAMINATION FOR INSURANCE PURPOSES: ICD-10-CM

## 2020-06-17 PROCEDURE — 99213 OFFICE O/P EST LOW 20 MIN: CPT | Performed by: NURSE PRACTITIONER

## 2020-06-17 ASSESSMENT — FIBROSIS 4 INDEX: FIB4 SCORE: 0.7

## 2020-06-17 NOTE — ASSESSMENT & PLAN NOTE
History of mildly elevated LDL, last labs from December.  She has gained some weight since that time, has had a difficult time following a healthy diet due to family stress sound COVID-19 pandemic.  She needs to have labs rechecked for insurance purposes  No family history of early CAD  Component      Latest Ref Rng & Units 12/23/2019           7:52 AM   Cholesterol,Tot      100 - 199 mg/dL 196   Triglycerides      0 - 149 mg/dL 104   HDL      >=40 mg/dL 70   LDL      <100 mg/dL 105 (H)

## 2020-06-17 NOTE — PROGRESS NOTES
Subjective:     Chief Complaint   Patient presents with   • Other     insurance form for new insurance     Flores Arias is a 44 y.o. female here today to follow up on:    Stressful life event affecting family  Patient reports that her 17-year-old son has been having a difficult time with anxiety and depression, has recently started using alcohol and marijuana and was hospitalized at [place].  This is causing some stress for the entire family although she feels that they are handling it okay, they are actively looking for counseling at this time.    Hyperlipidemia LDL goal <100  History of mildly elevated LDL, last labs from December.  She has gained some weight since that time, has had a difficult time following a healthy diet due to family stress sound COVID-19 pandemic.  She needs to have labs rechecked for insurance purposes  No family history of early CAD  Component      Latest Ref Rng & Units 12/23/2019           7:52 AM   Cholesterol,Tot      100 - 199 mg/dL 196   Triglycerides      0 - 149 mg/dL 104   HDL      >=40 mg/dL 70   LDL      <100 mg/dL 105 (H)       Elevated fasting glucose  History of mildly elevated LDL and prediabetes at one point, improvement over the last few years with watching her diet.  Most recent fasting glucose was normal.  She needs her labs rechecked for insurance purposes.  Both her parents have diabetes       Current medicines (including changes today)  Current Outpatient Medications   Medication Sig Dispense Refill   • norgestimate-ethinyl estradiol (SPRINTEC 28) 0.25-35 MG-MCG per tablet Take 1 Tab by mouth every day. TAKE ONE TABLET BY MOUTH DAILY 84 Tab 3     No current facility-administered medications for this visit.      She  has no past medical history of ASTHMA or Diabetes.    ROS included above     Objective:     /70 (BP Location: Left arm, Patient Position: Sitting, BP Cuff Size: Adult)   Pulse 74   Temp 37.1 °C (98.7 °F) (Temporal)   Resp 16   Ht 1.6 m  "(5' 3\")   Wt 62 kg (136 lb 11 oz)   SpO2 96%  Body mass index is 24.21 kg/m².     Physical Exam:  General: Alert, oriented in no acute distress.  Eye contact is good, speech is normal, affect calm  Lungs: clear to auscultation bilaterally, normal effort, no wheeze/ rhonchi/ rales.  CV: regular rate and rhythm, S1, S2, no murmur   Ext: no edema, color normal, vascularity normal, temperature normal    Assessment and Plan:   The following treatment plan was discussed  1. Stressful life event affecting family   she is having some difficulty with her teenage son but overall feels she is coping with this reasonably well.  Resources for counseling and intensive therapy provided   2. Pure hypercholesterolemia   mildly elevated LDL last labs, due for recheck  Lipid Profile   3. Screening for diabetes mellitus  Blood Glucose   4. Encounter for examination for insurance purposes  Lipid Profile    Blood Glucose   5. Elevated fasting glucose   3 of elevated glucose which she has been able to improve in the past with diet and exercise, most recent value was normal.  Recheck  Blood Glucose       Followup: Pending labs         Please note that this dictation was created using voice recognition software. I have worked with consultants from the vendor as well as technical experts from [facility] [employer] to optimize the interface. I have made every reasonable attempt to correct obvious errors, but I expect that there are errors of grammar and possibly content that I did not discover before finalizing the note.       "

## 2020-06-17 NOTE — ASSESSMENT & PLAN NOTE
History of mildly elevated LDL and prediabetes at one point, improvement over the last few years with watching her diet.  Most recent fasting glucose was normal.  She needs her labs rechecked for insurance purposes.  Both her parents have diabetes

## 2020-06-17 NOTE — ASSESSMENT & PLAN NOTE
Patient reports that her 17-year-old son has been having a difficult time with anxiety and depression, has recently started using alcohol and marijuana and was hospitalized at [place].  This is causing some stress for the entire family although she feels that they are handling it okay, they are actively looking for counseling at this time.

## 2020-06-18 ENCOUNTER — HOSPITAL ENCOUNTER (OUTPATIENT)
Dept: LAB | Facility: MEDICAL CENTER | Age: 45
End: 2020-06-18
Attending: NURSE PRACTITIONER
Payer: COMMERCIAL

## 2020-06-18 DIAGNOSIS — E78.00 PURE HYPERCHOLESTEROLEMIA: ICD-10-CM

## 2020-06-18 DIAGNOSIS — Z02.6 ENCOUNTER FOR EXAMINATION FOR INSURANCE PURPOSES: ICD-10-CM

## 2020-06-18 DIAGNOSIS — Z78.9 HISTORY OF MEASLES, MUMPS, RUBELLA (MMR) VACCINATION UNKNOWN: ICD-10-CM

## 2020-06-18 DIAGNOSIS — Z13.1 SCREENING FOR DIABETES MELLITUS: ICD-10-CM

## 2020-06-18 DIAGNOSIS — R73.01 ELEVATED FASTING GLUCOSE: ICD-10-CM

## 2020-06-18 DIAGNOSIS — Z78.9 UNKNOWN VARICELLA VACCINATION STATUS: ICD-10-CM

## 2020-06-18 PROCEDURE — 86765 RUBEOLA ANTIBODY: CPT

## 2020-06-18 PROCEDURE — 82947 ASSAY GLUCOSE BLOOD QUANT: CPT

## 2020-06-18 PROCEDURE — 86735 MUMPS ANTIBODY: CPT

## 2020-06-18 PROCEDURE — 86787 VARICELLA-ZOSTER ANTIBODY: CPT

## 2020-06-18 PROCEDURE — 36415 COLL VENOUS BLD VENIPUNCTURE: CPT

## 2020-06-18 PROCEDURE — 86762 RUBELLA ANTIBODY: CPT

## 2020-06-18 PROCEDURE — 80061 LIPID PANEL: CPT

## 2020-06-19 LAB
CHOLEST SERPL-MCNC: 194 MG/DL (ref 100–199)
FASTING STATUS PATIENT QL REPORTED: NORMAL
GLUCOSE SERPL-MCNC: 81 MG/DL (ref 65–99)
HDLC SERPL-MCNC: 62 MG/DL
LDLC SERPL CALC-MCNC: 110 MG/DL
TRIGL SERPL-MCNC: 110 MG/DL (ref 0–149)
VZV IGG SER IA-ACNC: 2.25

## 2020-06-21 LAB
MEV IGG SER IA-ACNC: 0.29
MUV IGG SER IA-ACNC: 0.66
RUBV AB SER QL: >500 IU/ML

## 2020-07-09 ENCOUNTER — HOSPITAL ENCOUNTER (OUTPATIENT)
Dept: RADIOLOGY | Facility: MEDICAL CENTER | Age: 45
End: 2020-07-09
Attending: NURSE PRACTITIONER
Payer: COMMERCIAL

## 2020-07-09 DIAGNOSIS — Z12.31 VISIT FOR SCREENING MAMMOGRAM: ICD-10-CM

## 2020-07-09 PROCEDURE — 77067 SCR MAMMO BI INCL CAD: CPT

## 2021-08-26 ENCOUNTER — OFFICE VISIT (OUTPATIENT)
Dept: MEDICAL GROUP | Facility: MEDICAL CENTER | Age: 46
End: 2021-08-26
Payer: COMMERCIAL

## 2021-08-26 VITALS
HEIGHT: 62 IN | HEART RATE: 80 BPM | OXYGEN SATURATION: 96 % | WEIGHT: 149.91 LBS | SYSTOLIC BLOOD PRESSURE: 130 MMHG | RESPIRATION RATE: 18 BRPM | BODY MASS INDEX: 27.59 KG/M2 | DIASTOLIC BLOOD PRESSURE: 82 MMHG | TEMPERATURE: 97.7 F

## 2021-08-26 DIAGNOSIS — Z63.79 STRESSFUL LIFE EVENT AFFECTING FAMILY: ICD-10-CM

## 2021-08-26 DIAGNOSIS — N94.6 DYSMENORRHEA: ICD-10-CM

## 2021-08-26 DIAGNOSIS — R73.03 PREDIABETES: ICD-10-CM

## 2021-08-26 DIAGNOSIS — Z13.29 THYROID DISORDER SCREEN: ICD-10-CM

## 2021-08-26 DIAGNOSIS — Z30.09 FAMILY PLANNING: ICD-10-CM

## 2021-08-26 DIAGNOSIS — E78.00 PURE HYPERCHOLESTEROLEMIA: ICD-10-CM

## 2021-08-26 DIAGNOSIS — Z12.31 ENCOUNTER FOR SCREENING MAMMOGRAM FOR MALIGNANT NEOPLASM OF BREAST: ICD-10-CM

## 2021-08-26 DIAGNOSIS — Z00.00 ANNUAL PHYSICAL EXAM: ICD-10-CM

## 2021-08-26 PROCEDURE — 99396 PREV VISIT EST AGE 40-64: CPT | Performed by: NURSE PRACTITIONER

## 2021-08-26 RX ORDER — SODIUM FLUORIDE 1.1 G/100G
CREAM ORAL
COMMUNITY
Start: 2021-08-14 | End: 2022-05-18

## 2021-08-26 RX ORDER — NORGESTIMATE AND ETHINYL ESTRADIOL 0.25-0.035
1 KIT ORAL DAILY
Qty: 84 TABLET | Refills: 3 | Status: SHIPPED | OUTPATIENT
Start: 2021-08-26 | End: 2021-11-01 | Stop reason: SDUPTHER

## 2021-08-26 NOTE — ASSESSMENT & PLAN NOTE
She is still struggling with her 18-year-old son's mental health problems, he is currently hospitalized and has had several hospitalizations over the last few years.  This is been stressful for the family, she states that she is somewhat tearful at times but does not feel that she is clinically depressed.  They are seeing a family therapist  PHQ-9 score 4

## 2021-08-26 NOTE — PROGRESS NOTES
"Subjective:     Chief Complaint   Patient presents with   • Annual Wellness Visit     Flores Arias is a 45 y.o. female here today to follow up on:    Prediabetes  Last A1c just slightly above normal at 5.8.  No medication for this issue.  She is working 2 jobs and has not really had time to dedicate to exercise but is very active through the day.  Generally following a healthy diet    Dysmenorrhea  Continues to be well controlled on oral contraceptive, she would like to continue with this    Stressful life event affecting family  She is still struggling with her 18-year-old son's mental health problems, he is currently hospitalized and has had several hospitalizations over the last few years.  This is been stressful for the family, she states that she is somewhat tearful at times but does not feel that she is clinically depressed.  They are seeing a family therapist  PHQ-9 score 4       Current medicines (including changes today)  Current Outpatient Medications   Medication Sig Dispense Refill   • norgestimate-ethinyl estradiol (SPRINTEC 28) 0.25-35 MG-MCG per tablet Take 1 Tablet by mouth every day. 84 Tablet 3   • DENTA 5000 PLUS 1.1 % Cream        No current facility-administered medications for this visit.     She  has no past medical history of ASTHMA or Diabetes.    ROS included above     Objective:     /82 (BP Location: Left arm, Patient Position: Sitting, BP Cuff Size: Adult)   Pulse 80   Temp 36.5 °C (97.7 °F) (Temporal)   Resp 18   Ht 1.575 m (5' 2\")   Wt 68 kg (149 lb 14.6 oz)   SpO2 96%  Body mass index is 27.42 kg/m².     Physical Exam:  General: Alert, oriented in no acute distress.  Eye contact is good, speech is normal, affect calm  HEENT:  TMs gray with good landmarks bilaterally. No lymphadenopathy.  Lungs: clear to auscultation bilaterally, normal effort, no wheeze/ rhonchi/ rales.  CV: regular rate and rhythm, S1, S2, no murmur  Abdomen: soft, nontender, No CVAT, no " hepatosplenomegaly  Ext: no edema, color normal, vascularity normal, temperature normal    Assessment and Plan:   The following treatment plan was discussed   1. Annual physical exam   normal exam.  Mammogram ordered, encourage mammogram the next 1 to 2 years.   2. Encounter for screening mammogram for malignant neoplasm of breast  MA-SCREENING MAMMO BILAT W/TOMOSYNTHESIS W/CAD   3. Family planning   stable  norgestimate-ethinyl estradiol (SPRINTEC 28) 0.25-35 MG-MCG per tablet   4. Prediabetes   last A1c 5.8  HEMOGLOBIN A1C    Comp Metabolic Panel   5. Thyroid disorder screen  TSH WITH REFLEX TO FT4   6. Pure hypercholesterolemia  Lipid Profile   7. Dysmenorrhea   controlled with oral contraceptive   8. Stressful life event affecting family   encouraged to continue with counseling.  She does not feel that she is clinically depressed or needs assistance at this time       Followup: Pending labs         Please note that this dictation was created using voice recognition software. I have worked with consultants from the vendor as well as technical experts from [facility] [employer] to optimize the interface. I have made every reasonable attempt to correct obvious errors, but I expect that there are errors of grammar and possibly content that I did not discover before finalizing the note.

## 2021-08-26 NOTE — ASSESSMENT & PLAN NOTE
Last A1c just slightly above normal at 5.8.  No medication for this issue.  She is working 2 jobs and has not really had time to dedicate to exercise but is very active through the day.  Generally following a healthy diet

## 2021-09-22 ENCOUNTER — HOSPITAL ENCOUNTER (OUTPATIENT)
Dept: RADIOLOGY | Facility: MEDICAL CENTER | Age: 46
End: 2021-09-22
Attending: NURSE PRACTITIONER
Payer: COMMERCIAL

## 2021-09-22 DIAGNOSIS — Z12.31 ENCOUNTER FOR SCREENING MAMMOGRAM FOR MALIGNANT NEOPLASM OF BREAST: ICD-10-CM

## 2021-09-22 PROCEDURE — 77063 BREAST TOMOSYNTHESIS BI: CPT

## 2021-09-23 ENCOUNTER — TELEPHONE (OUTPATIENT)
Dept: MEDICAL GROUP | Facility: MEDICAL CENTER | Age: 46
End: 2021-09-23

## 2021-09-23 DIAGNOSIS — N63.20 MASS OF LEFT BREAST ON MAMMOGRAM: ICD-10-CM

## 2021-09-23 NOTE — TELEPHONE ENCOUNTER
Called and spoke to pt regarding mammogram results per PCP request. Pt verbalized understanding. Provided pt with number to schedule further imaging. Advised pt to call with any other questions she might have.

## 2021-09-23 NOTE — TELEPHONE ENCOUNTER
----- Message from Joe Lobo M.D. sent at 9/23/2021  9:27 AM PDT -----  I sent message to patient on [employer] but please also call patient regarding abnormal mammogram results.  It showed 2 circumscribed masses in left upper outer breast.  Further evaluation with diagnostic mammogram and ultrasound recommended.  I ordered diagnostic mammogram and ultrasound.  Please recommend patient to call at [phone] to schedule these.

## 2021-09-29 ENCOUNTER — HOSPITAL ENCOUNTER (OUTPATIENT)
Dept: RADIOLOGY | Facility: MEDICAL CENTER | Age: 46
End: 2021-09-29
Attending: NURSE PRACTITIONER
Payer: COMMERCIAL

## 2021-09-29 ENCOUNTER — HOSPITAL ENCOUNTER (OUTPATIENT)
Dept: RADIOLOGY | Facility: MEDICAL CENTER | Age: 46
End: 2021-09-29
Attending: FAMILY MEDICINE
Payer: COMMERCIAL

## 2021-09-29 DIAGNOSIS — R92.8 ABNORMAL MAMMOGRAM: ICD-10-CM

## 2021-09-29 DIAGNOSIS — N63.20 MASS OF LEFT BREAST ON MAMMOGRAM: ICD-10-CM

## 2021-09-29 PROCEDURE — G0279 TOMOSYNTHESIS, MAMMO: HCPCS

## 2021-11-01 DIAGNOSIS — Z30.09 FAMILY PLANNING: ICD-10-CM

## 2021-11-01 RX ORDER — NORGESTIMATE AND ETHINYL ESTRADIOL 0.25-0.035
1 KIT ORAL DAILY
Qty: 84 TABLET | Refills: 3 | Status: SHIPPED | OUTPATIENT
Start: 2021-11-01 | End: 2022-05-18 | Stop reason: SDUPTHER

## 2021-11-01 NOTE — TELEPHONE ENCOUNTER
Received request via: Pharmacy    Was the patient seen in the last year in this department? Yes 8/26/21    Does the patient have an active prescription (recently filled or refills available) for medication(s) requested? No

## 2022-04-18 ENCOUNTER — TELEPHONE (OUTPATIENT)
Dept: SCHEDULING | Facility: IMAGING CENTER | Age: 47
End: 2022-04-18
Payer: COMMERCIAL

## 2022-05-17 SDOH — ECONOMIC STABILITY: HOUSING INSECURITY
IN THE LAST 12 MONTHS, WAS THERE A TIME WHEN YOU DID NOT HAVE A STEADY PLACE TO SLEEP OR SLEPT IN A SHELTER (INCLUDING NOW)?: NO

## 2022-05-17 SDOH — HEALTH STABILITY: MENTAL HEALTH
STRESS IS WHEN SOMEONE FEELS TENSE, NERVOUS, ANXIOUS, OR CAN'T SLEEP AT NIGHT BECAUSE THEIR MIND IS TROUBLED. HOW STRESSED ARE YOU?: NOT AT ALL

## 2022-05-17 SDOH — ECONOMIC STABILITY: TRANSPORTATION INSECURITY
IN THE PAST 12 MONTHS, HAS LACK OF RELIABLE TRANSPORTATION KEPT YOU FROM MEDICAL APPOINTMENTS, MEETINGS, WORK OR FROM GETTING THINGS NEEDED FOR DAILY LIVING?: NO

## 2022-05-17 SDOH — ECONOMIC STABILITY: INCOME INSECURITY: HOW HARD IS IT FOR YOU TO PAY FOR THE VERY BASICS LIKE FOOD, HOUSING, MEDICAL CARE, AND HEATING?: NOT VERY HARD

## 2022-05-17 SDOH — ECONOMIC STABILITY: INCOME INSECURITY: IN THE LAST 12 MONTHS, WAS THERE A TIME WHEN YOU WERE NOT ABLE TO PAY THE MORTGAGE OR RENT ON TIME?: NO

## 2022-05-17 SDOH — ECONOMIC STABILITY: FOOD INSECURITY: WITHIN THE PAST 12 MONTHS, YOU WORRIED THAT YOUR FOOD WOULD RUN OUT BEFORE YOU GOT MONEY TO BUY MORE.: NEVER TRUE

## 2022-05-17 SDOH — ECONOMIC STABILITY: FOOD INSECURITY: WITHIN THE PAST 12 MONTHS, THE FOOD YOU BOUGHT JUST DIDN'T LAST AND YOU DIDN'T HAVE MONEY TO GET MORE.: NEVER TRUE

## 2022-05-17 SDOH — HEALTH STABILITY: PHYSICAL HEALTH: ON AVERAGE, HOW MANY DAYS PER WEEK DO YOU ENGAGE IN MODERATE TO STRENUOUS EXERCISE (LIKE A BRISK WALK)?: 0 DAYS

## 2022-05-17 SDOH — ECONOMIC STABILITY: TRANSPORTATION INSECURITY
IN THE PAST 12 MONTHS, HAS THE LACK OF TRANSPORTATION KEPT YOU FROM MEDICAL APPOINTMENTS OR FROM GETTING MEDICATIONS?: NO

## 2022-05-17 SDOH — ECONOMIC STABILITY: HOUSING INSECURITY: IN THE LAST 12 MONTHS, HOW MANY PLACES HAVE YOU LIVED?: 1

## 2022-05-17 SDOH — HEALTH STABILITY: PHYSICAL HEALTH: ON AVERAGE, HOW MANY MINUTES DO YOU ENGAGE IN EXERCISE AT THIS LEVEL?: 0 MIN

## 2022-05-17 SDOH — ECONOMIC STABILITY: TRANSPORTATION INSECURITY
IN THE PAST 12 MONTHS, HAS LACK OF TRANSPORTATION KEPT YOU FROM MEETINGS, WORK, OR FROM GETTING THINGS NEEDED FOR DAILY LIVING?: NO

## 2022-05-17 ASSESSMENT — SOCIAL DETERMINANTS OF HEALTH (SDOH)
HOW OFTEN DO YOU HAVE A DRINK CONTAINING ALCOHOL: MONTHLY OR LESS
HOW OFTEN DO YOU HAVE SIX OR MORE DRINKS ON ONE OCCASION: NEVER
IN A TYPICAL WEEK, HOW MANY TIMES DO YOU TALK ON THE PHONE WITH FAMILY, FRIENDS, OR NEIGHBORS?: TWICE A WEEK
DO YOU BELONG TO ANY CLUBS OR ORGANIZATIONS SUCH AS CHURCH GROUPS UNIONS, FRATERNAL OR ATHLETIC GROUPS, OR SCHOOL GROUPS?: NO
HOW OFTEN DO YOU ATTEND CHURCH OR RELIGIOUS SERVICES?: NEVER
HOW OFTEN DO YOU GET TOGETHER WITH FRIENDS OR RELATIVES?: TWICE A WEEK
HOW HARD IS IT FOR YOU TO PAY FOR THE VERY BASICS LIKE FOOD, HOUSING, MEDICAL CARE, AND HEATING?: NOT VERY HARD
HOW OFTEN DO YOU GET TOGETHER WITH FRIENDS OR RELATIVES?: TWICE A WEEK
IN A TYPICAL WEEK, HOW MANY TIMES DO YOU TALK ON THE PHONE WITH FAMILY, FRIENDS, OR NEIGHBORS?: TWICE A WEEK
HOW OFTEN DO YOU ATTENT MEETINGS OF THE CLUB OR ORGANIZATION YOU BELONG TO?: NEVER
HOW OFTEN DO YOU ATTEND CHURCH OR RELIGIOUS SERVICES?: NEVER
WITHIN THE PAST 12 MONTHS, YOU WORRIED THAT YOUR FOOD WOULD RUN OUT BEFORE YOU GOT THE MONEY TO BUY MORE: NEVER TRUE
HOW MANY DRINKS CONTAINING ALCOHOL DO YOU HAVE ON A TYPICAL DAY WHEN YOU ARE DRINKING: 1 OR 2
DO YOU BELONG TO ANY CLUBS OR ORGANIZATIONS SUCH AS CHURCH GROUPS UNIONS, FRATERNAL OR ATHLETIC GROUPS, OR SCHOOL GROUPS?: NO
HOW OFTEN DO YOU ATTENT MEETINGS OF THE CLUB OR ORGANIZATION YOU BELONG TO?: NEVER

## 2022-05-17 ASSESSMENT — LIFESTYLE VARIABLES
HOW OFTEN DO YOU HAVE SIX OR MORE DRINKS ON ONE OCCASION: NEVER
HOW MANY STANDARD DRINKS CONTAINING ALCOHOL DO YOU HAVE ON A TYPICAL DAY: 1 OR 2
AUDIT-C TOTAL SCORE: 1
SKIP TO QUESTIONS 9-10: 1
HOW OFTEN DO YOU HAVE A DRINK CONTAINING ALCOHOL: MONTHLY OR LESS

## 2022-05-18 ENCOUNTER — OFFICE VISIT (OUTPATIENT)
Dept: MEDICAL GROUP | Facility: MEDICAL CENTER | Age: 47
End: 2022-05-18
Payer: COMMERCIAL

## 2022-05-18 VITALS
DIASTOLIC BLOOD PRESSURE: 84 MMHG | HEIGHT: 62 IN | HEART RATE: 85 BPM | TEMPERATURE: 98 F | SYSTOLIC BLOOD PRESSURE: 120 MMHG | WEIGHT: 153.99 LBS | BODY MASS INDEX: 28.34 KG/M2 | OXYGEN SATURATION: 97 %

## 2022-05-18 DIAGNOSIS — R73.03 PREDIABETES: ICD-10-CM

## 2022-05-18 DIAGNOSIS — Z30.09 FAMILY PLANNING: ICD-10-CM

## 2022-05-18 DIAGNOSIS — Z13.29 THYROID DISORDER SCREEN: ICD-10-CM

## 2022-05-18 DIAGNOSIS — E04.1 CYST OF THYROID: ICD-10-CM

## 2022-05-18 DIAGNOSIS — Z00.00 PREVENTATIVE HEALTH CARE: ICD-10-CM

## 2022-05-18 PROCEDURE — 99214 OFFICE O/P EST MOD 30 MIN: CPT | Performed by: INTERNAL MEDICINE

## 2022-05-18 RX ORDER — NORGESTIMATE AND ETHINYL ESTRADIOL 0.25-0.035
1 KIT ORAL DAILY
Qty: 84 TABLET | Refills: 3 | Status: SHIPPED | OUTPATIENT
Start: 2022-05-18 | End: 2022-07-03 | Stop reason: SDUPTHER

## 2022-05-18 ASSESSMENT — ENCOUNTER SYMPTOMS
RESPIRATORY NEGATIVE: 1
CARDIOVASCULAR NEGATIVE: 1
EYES NEGATIVE: 1
CONSTITUTIONAL NEGATIVE: 1
GASTROINTESTINAL NEGATIVE: 1
PSYCHIATRIC NEGATIVE: 1
NEUROLOGICAL NEGATIVE: 1
MUSCULOSKELETAL NEGATIVE: 1

## 2022-05-18 ASSESSMENT — PATIENT HEALTH QUESTIONNAIRE - PHQ9: CLINICAL INTERPRETATION OF PHQ2 SCORE: 0

## 2022-05-18 NOTE — ASSESSMENT & PLAN NOTE
Not sexually active, however using OCP for symptoms of dysmenorrhea.  She has never smoked, she has no personal or family history of blood clots.  Advised on risks and benefits.  We will discuss alternatives at the next visit.

## 2022-05-18 NOTE — ASSESSMENT & PLAN NOTE
No follow-up was required: Patient is asymptomatic: No changes of the voice, difficulty swallowing, neck pain

## 2022-05-18 NOTE — PROGRESS NOTES
Subjective:     Chief Complaint   Patient presents with   • Establish Care      Diagnoses of Prediabetes, Family planning, Preventative health care, Thyroid disorder screen, and Thyroid cyst were pertinent to this visit.    HISTORY OF THE PRESENT ILLNESS: Patient is a 46 y.o. female. This pleasant patient is here today to establish care. Her prior PCP was Adrianne Willis.    Problem   Family Planning    Patient is currently not sexually active, however she is taking her OCP for symptoms of dysmenorrhea.  She has not been sexually active in the last few months, she is taking her birth control on a regular basis and she would like to continue.  She is requesting refill today.  She had her Pap smear completed in 2018 with HPV testing and she would like to repeated-we will schedule appointment with JOSEPHINE Mcfarlane.     Prediabetes    Lab Results   Component Value Date/Time    HBA1C 5.8 (H) 12/23/2019 07:52 AM   Healthful lifestyle measures, less carbs, avoiding sodas.  Advised on  30 minutes of aerobic exercise daily.     Thyroid cyst    U/s 04/16: Stable right 2.5 mm colloid cyst for which additional followup is not required.         No past medical history on file.  Past Surgical History:   Procedure Laterality Date   • APPENDECTOMY LAPAROSCOPIC N/A 12/6/2016    Procedure: APPENDECTOMY LAPAROSCOPIC;  Surgeon: Pilar Jennings M.D.;  Location: SURGERY [facility];  Service:    • BREAST BIOPSY  2015    left breast, benign biopsy     Family History   Problem Relation Age of Onset   • Diabetes Mother    • Hypertension Father    • Heart Disease Father         MI age 69   • Diabetes Father    • Alcohol/Drug Brother      Social History     Tobacco Use   • Smoking status: Never Smoker   • Smokeless tobacco: Never Used   Substance Use Topics   • Alcohol use: Not Currently     Comment: once per month   • Drug use: No     Current Outpatient Medications Ordered in Epic   Medication Sig Dispense Refill   •  "norgestimate-ethinyl estradiol (SPRINTEC 28) 0.25-35 MG-MCG per tablet Take 1 Tablet by mouth every day. 84 Tablet 3     No current Epic-ordered facility-administered medications on file.     Health Maintenance: Pap smear to be scheduled with JOSEPHINE Mcfarlane.    Review of Systems   Constitutional: Negative.    HENT: Negative.    Eyes: Negative.    Respiratory: Negative.    Cardiovascular: Negative.    Gastrointestinal: Negative.    Musculoskeletal: Negative.    Neurological: Negative.    Psychiatric/Behavioral: Negative.      Objective:     Exam: /84 (BP Location: Right arm, Patient Position: Sitting, BP Cuff Size: Adult)   Pulse 85   Temp 36.7 °C (98 °F) (Temporal)   Ht 1.575 m (5' 2\")   Wt 69.8 kg (153 lb 15.9 oz)   SpO2 97%  Body mass index is 28.17 kg/m².    Physical Exam  Constitutional:       General: She is not in acute distress.     Appearance: Normal appearance. She is not toxic-appearing.   HENT:      Head: Normocephalic and atraumatic.      Nose: Nose normal. No congestion.      Mouth/Throat:      Mouth: Mucous membranes are moist.      Pharynx: Oropharynx is clear. No oropharyngeal exudate.   Eyes:      General: No scleral icterus.  Cardiovascular:      Rate and Rhythm: Normal rate and regular rhythm.      Pulses: Normal pulses.      Heart sounds: Normal heart sounds. No murmur heard.  Pulmonary:      Effort: Pulmonary effort is normal.      Breath sounds: Normal breath sounds.   Musculoskeletal:         General: Normal range of motion.   Skin:     General: Skin is warm and dry.   Neurological:      General: No focal deficit present.      Mental Status: She is alert and oriented to person, place, and time. Mental status is at baseline.   Psychiatric:         Mood and Affect: Mood normal.         Behavior: Behavior normal.         Thought Content: Thought content normal.         Judgment: Judgment normal.       Labs: Reviewed results of lipid panel, CMP, A1c from 12/23/2019    Assessment & " Plan:   46 y.o. female with the following -    Problem List Items Addressed This Visit     Family planning     Not sexually active, however using OCP for symptoms of dysmenorrhea.  She has never smoked, she has no personal or family history of blood clots.  Advised on risks and benefits.  We will discuss alternatives at the next visit.           Relevant Medications    norgestimate-ethinyl estradiol (SPRINTEC 28) 0.25-35 MG-MCG per tablet    Prediabetes     Chronic, healthful lifestyle measures.  Obtain A1c level.            Relevant Orders    HEMOGLOBIN A1C    Preventative health care    Relevant Orders    CBC WITH DIFFERENTIAL    Comp Metabolic Panel    Thyroid cyst     No follow-up was required: Patient is asymptomatic: No changes of the voice, difficulty swallowing, neck pain           Relevant Orders    TSH WITH REFLEX TO FT4      Other Visit Diagnoses     Thyroid disorder screen        Relevant Orders    TSH WITH REFLEX TO FT4        Return in about 1 year (around 5/18/2023), or if symptoms worsen or fail to improve, for [facility] for PAP .    Please note that this dictation was created using voice recognition software. I have made every reasonable attempt to correct obvious errors, but I expect that there are errors of grammar and possibly content that I did not discover before finalizing the note.

## 2022-06-08 ENCOUNTER — HOSPITAL ENCOUNTER (OUTPATIENT)
Dept: LAB | Facility: MEDICAL CENTER | Age: 47
End: 2022-06-08
Attending: INTERNAL MEDICINE
Payer: COMMERCIAL

## 2022-06-08 DIAGNOSIS — Z13.29 THYROID DISORDER SCREEN: ICD-10-CM

## 2022-06-08 DIAGNOSIS — Z00.00 PREVENTATIVE HEALTH CARE: ICD-10-CM

## 2022-06-08 DIAGNOSIS — R73.03 PREDIABETES: ICD-10-CM

## 2022-06-08 DIAGNOSIS — E04.1 CYST OF THYROID: ICD-10-CM

## 2022-06-08 LAB
ALBUMIN SERPL BCP-MCNC: 3.7 G/DL (ref 3.2–4.9)
ALBUMIN/GLOB SERPL: 1.2 G/DL
ALP SERPL-CCNC: 56 U/L (ref 30–99)
ALT SERPL-CCNC: 19 U/L (ref 2–50)
ANION GAP SERPL CALC-SCNC: 12 MMOL/L (ref 7–16)
AST SERPL-CCNC: 26 U/L (ref 12–45)
BASOPHILS # BLD AUTO: 0.6 % (ref 0–1.8)
BASOPHILS # BLD: 0.04 K/UL (ref 0–0.12)
BILIRUB SERPL-MCNC: 0.3 MG/DL (ref 0.1–1.5)
BUN SERPL-MCNC: 7 MG/DL (ref 8–22)
CALCIUM SERPL-MCNC: 8.7 MG/DL (ref 8.5–10.5)
CHLORIDE SERPL-SCNC: 97 MMOL/L (ref 96–112)
CO2 SERPL-SCNC: 24 MMOL/L (ref 20–33)
CREAT SERPL-MCNC: 0.5 MG/DL (ref 0.5–1.4)
EOSINOPHIL # BLD AUTO: 0.07 K/UL (ref 0–0.51)
EOSINOPHIL NFR BLD: 1 % (ref 0–6.9)
ERYTHROCYTE [DISTWIDTH] IN BLOOD BY AUTOMATED COUNT: 43.5 FL (ref 35.9–50)
EST. AVERAGE GLUCOSE BLD GHB EST-MCNC: 186 MG/DL
GFR SERPLBLD CREATININE-BSD FMLA CKD-EPI: 116 ML/MIN/1.73 M 2
GLOBULIN SER CALC-MCNC: 3 G/DL (ref 1.9–3.5)
GLUCOSE SERPL-MCNC: 105 MG/DL (ref 65–99)
HBA1C MFR BLD: 8.1 % (ref 4–5.6)
HCT VFR BLD AUTO: 39.2 % (ref 37–47)
HGB BLD-MCNC: 13 G/DL (ref 12–16)
IMM GRANULOCYTES # BLD AUTO: 0.02 K/UL (ref 0–0.11)
IMM GRANULOCYTES NFR BLD AUTO: 0.3 % (ref 0–0.9)
LYMPHOCYTES # BLD AUTO: 1.93 K/UL (ref 1–4.8)
LYMPHOCYTES NFR BLD: 28.8 % (ref 22–41)
MCH RBC QN AUTO: 29.3 PG (ref 27–33)
MCHC RBC AUTO-ENTMCNC: 33.2 G/DL (ref 33.6–35)
MCV RBC AUTO: 88.5 FL (ref 81.4–97.8)
MONOCYTES # BLD AUTO: 0.47 K/UL (ref 0–0.85)
MONOCYTES NFR BLD AUTO: 7 % (ref 0–13.4)
NEUTROPHILS # BLD AUTO: 4.16 K/UL (ref 2–7.15)
NEUTROPHILS NFR BLD: 62.3 % (ref 44–72)
NRBC # BLD AUTO: 0 K/UL
NRBC BLD-RTO: 0 /100 WBC
PLATELET # BLD AUTO: 297 K/UL (ref 164–446)
PMV BLD AUTO: 11.6 FL (ref 9–12.9)
POTASSIUM SERPL-SCNC: 3.6 MMOL/L (ref 3.6–5.5)
PROT SERPL-MCNC: 6.7 G/DL (ref 6–8.2)
RBC # BLD AUTO: 4.43 M/UL (ref 4.2–5.4)
SODIUM SERPL-SCNC: 133 MMOL/L (ref 135–145)
TSH SERPL DL<=0.005 MIU/L-ACNC: 1.43 UIU/ML (ref 0.38–5.33)
WBC # BLD AUTO: 6.7 K/UL (ref 4.8–10.8)

## 2022-06-08 PROCEDURE — 80053 COMPREHEN METABOLIC PANEL: CPT

## 2022-06-08 PROCEDURE — 84443 ASSAY THYROID STIM HORMONE: CPT

## 2022-06-08 PROCEDURE — 85025 COMPLETE CBC W/AUTO DIFF WBC: CPT

## 2022-06-08 PROCEDURE — 83036 HEMOGLOBIN GLYCOSYLATED A1C: CPT

## 2022-06-08 PROCEDURE — 36415 COLL VENOUS BLD VENIPUNCTURE: CPT

## 2022-06-17 ENCOUNTER — OFFICE VISIT (OUTPATIENT)
Dept: MEDICAL GROUP | Facility: MEDICAL CENTER | Age: 47
End: 2022-06-17
Payer: COMMERCIAL

## 2022-06-17 ENCOUNTER — HOSPITAL ENCOUNTER (OUTPATIENT)
Facility: MEDICAL CENTER | Age: 47
End: 2022-06-17
Attending: FAMILY MEDICINE
Payer: COMMERCIAL

## 2022-06-17 VITALS
DIASTOLIC BLOOD PRESSURE: 76 MMHG | HEART RATE: 96 BPM | OXYGEN SATURATION: 98 % | WEIGHT: 152.12 LBS | TEMPERATURE: 97.6 F | RESPIRATION RATE: 18 BRPM | HEIGHT: 62 IN | BODY MASS INDEX: 27.99 KG/M2 | SYSTOLIC BLOOD PRESSURE: 132 MMHG

## 2022-06-17 DIAGNOSIS — Z01.419 WOMEN'S ANNUAL ROUTINE GYNECOLOGICAL EXAMINATION: ICD-10-CM

## 2022-06-17 PROCEDURE — 99396 PREV VISIT EST AGE 40-64: CPT | Performed by: FAMILY MEDICINE

## 2022-06-17 PROCEDURE — 88175 CYTOPATH C/V AUTO FLUID REDO: CPT

## 2022-06-17 PROCEDURE — 87624 HPV HI-RISK TYP POOLED RSLT: CPT

## 2022-06-17 ASSESSMENT — FIBROSIS 4 INDEX: FIB4 SCORE: 0.94

## 2022-06-17 NOTE — ASSESSMENT & PLAN NOTE
Patient Counseling:  --Discussed moderation in sodium/caffeine intake, saturated fat and cholesterol, caloric balance, sufficient fresh fruits/vegetables, fiber, iron, and 0.4-0.8mg of folate supplement per day (for females capable of pregnancy).  --Discussed brushing, flossing, and dental visits.   --Encouraged regular exercise.   --Discussed tobacco, alcohol, or other drug use; availability of treatment for abuse.   --Discussed sexually transmitted infections, partner selection, use of condoms, avoidance of unintended pregnancy and contraceptive alternatives.  --Injury prevention: Discussed safety belts, safety helmets, smoke detector, etc.  -Discussed  breast self exam     Health maintenance: Due for mammogram in September  Immunizations per orders  Patient counseled about skin care, diet, supplements, and exercise.  Labs per orders

## 2022-06-17 NOTE — PROGRESS NOTES
Subjective:     CC:   Chief Complaint   Patient presents with   • Gynecologic Exam       HPI:   Flores Arias is a 46 y.o. female who presents for annual exam    Patient has GYN provider: No   Last Pap Smear: 2018  H/O Abnormal Pap: Yes, HPV  Last Mammogram: 2021  Last Bone Density Test: N/A  Last Colorectal Cancer Screening: N/A  Cholesterol Screenin2020, slightly elevated LDL.   Diabetes Screenin2022 glucose slightly elevated at 105.    Exercise: sporadic irregular exercise, <half hour walking weekly  Diet: Regular      No LMP recorded. (Menstrual status: Irregular Menses).  Hx STDs: No  Birth control: JOESPH  Menses every month with 28 days with light bleeding.  Denies cramping.  No significant bloating/fluid retention, pelvic pain, or dyspareunia. No abnormal vaginal discharge.  No breast tenderness, mass, nipple discharge, changes in size or contour, or abnormal cyclic discomfort.    Advanced directive: N/A  Substance Abuse: No concerns  Safe in relationship.   Seat belts, bike helmet, gun safety discussed.  Sun protection used.    Immunizations  Influenza: Did not complete last year   HPV series: Aged out   Tetanus: 2019   Shingles: n/a   Pneumococcal : No risk factors   Other immunizations: Completed her COVID Pfizer series     Problem   Women's Annual Routine Gynecological Examination        OB History    Para Term  AB Living   4 2 0 0 2 0   SAB IAB Ectopic Molar Multiple Live Births   0 2 0 0 0 0      She  reports being sexually active and has had partner(s) who are male.    She  has no past medical history of ASTHMA or Diabetes.  She  has a past surgical history that includes breast biopsy () and appendectomy laparoscopic (N/A, 2016).    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 84 Tablet 3     No current facility-administered medications for this visit.     No Known Allergies    Review of Systems  Constitutional: Negative for fever, chills and malaise/fatigue.   HENT: Negative for congestion.    Eyes: Negative for pain.   Respiratory: Negative for cough and shortness of breath.    Cardiovascular: Negative for chest pain and leg swelling.   Gastrointestinal: Negative for nausea, vomiting, abdominal pain and diarrhea.   Genitourinary: Negative for dysuria and hematuria.   Skin: Negative for rash.   Neurological: Negative for dizziness, focal weakness and headaches.   Endo/Heme/Allergies: Does not bruise/bleed easily.   Psychiatric/Behavioral: Negative for depression.  The patient is not nervous/anxious.      Objective:   /76 (BP Location: Right arm, Patient Position: Sitting, BP Cuff Size: Adult)   Pulse 96   Temp 36.4 °C (97.6 °F) (Temporal)   Resp 18   Ht 1.575 m (5' 2\")   Wt 69 kg (152 lb 1.9 oz)   SpO2 98%   BMI 27.82 kg/m²     Wt Readings from Last 4 Encounters:   06/17/22 69 kg (152 " lb 1.9 oz)   05/18/22 69.8 kg (153 lb 15.9 oz)   08/26/21 68 kg (149 lb 14.6 oz)   06/17/20 62 kg (136 lb 11 oz)       A chaperone was offered to the patient during today's exam. Chaperone name: Tash BURTON student was present.    Physical Exam  Exam conducted with a chaperone present.   Constitutional:       General: She is not in acute distress.  HENT:      Head: Normocephalic and atraumatic.   Eyes:      Conjunctiva/sclera: Conjunctivae normal.      Pupils: Pupils are equal, round, and reactive to light.   Pulmonary:      Effort: Pulmonary effort is normal. No respiratory distress.   Chest:   Breasts: Breasts are symmetrical.      Right: Normal. No inverted nipple, mass, nipple discharge or skin change.      Left: Normal. No inverted nipple, mass, nipple discharge or skin change.       Abdominal:      General: There is no distension.   Genitourinary:     Pubic Area: No rash.       Labia:         Right: No rash, tenderness or lesion.         Left: No rash, tenderness or lesion.       Vagina: Normal. No tenderness.      Cervix: Discharge (Yellow) present. No cervical motion tenderness, friability, lesion or erythema.      Uterus: Normal.       Adnexa: Right adnexa normal and left adnexa normal.   Musculoskeletal:      Cervical back: Normal range of motion and neck supple.   Skin:     General: Skin is warm and dry.      Findings: No rash.   Neurological:      Mental Status: She is alert and oriented to person, place, and time.      Gait: Gait is intact.   Psychiatric:         Mood and Affect: Affect normal.         Imaging:  Pertinent imaging to review    Labs:   No pertinent labs to review    Assessment and Plan:     Problem List Items Addressed This Visit     Women's annual routine gynecological examination     Patient Counseling:  --Discussed moderation in sodium/caffeine intake, saturated fat and cholesterol, caloric balance, sufficient fresh fruits/vegetables, fiber, iron, and 0.4-0.8mg of folate supplement per  day (for females capable of pregnancy).  --Discussed brushing, flossing, and dental visits.   --Encouraged regular exercise.   --Discussed tobacco, alcohol, or other drug use; availability of treatment for abuse.   --Discussed sexually transmitted infections, partner selection, use of condoms, avoidance of unintended pregnancy and contraceptive alternatives.  --Injury prevention: Discussed safety belts, safety helmets, smoke detector, etc.  -Discussed  breast self exam     Health maintenance: Due for mammogram in September  Immunizations per orders  Patient counseled about skin care, diet, supplements, and exercise.  Labs per orders           Relevant Orders    THINPREP PAP WITH HPV           Follow-up: Return if symptoms worsen or fail to improve.       Please note that this dictation was created using voice recognition software. I have made every reasonable attempt to correct obvious errors, but I expect that there are errors of grammar and possibly content that I did not discover before finalizing the note.

## 2022-06-19 DIAGNOSIS — Z01.419 WOMEN'S ANNUAL ROUTINE GYNECOLOGICAL EXAMINATION: ICD-10-CM

## 2022-11-08 ENCOUNTER — APPOINTMENT (OUTPATIENT)
Dept: RADIOLOGY | Facility: MEDICAL CENTER | Age: 47
End: 2022-11-08
Attending: INTERNAL MEDICINE
Payer: COMMERCIAL

## 2022-11-08 DIAGNOSIS — Z12.31 VISIT FOR SCREENING MAMMOGRAM: ICD-10-CM

## 2023-01-03 ENCOUNTER — APPOINTMENT (OUTPATIENT)
Dept: RADIOLOGY | Facility: MEDICAL CENTER | Age: 48
End: 2023-01-03
Attending: INTERNAL MEDICINE
Payer: COMMERCIAL

## 2023-01-03 DIAGNOSIS — Z12.31 VISIT FOR SCREENING MAMMOGRAM: ICD-10-CM

## 2023-03-20 ENCOUNTER — HOSPITAL ENCOUNTER (OUTPATIENT)
Dept: RADIOLOGY | Facility: MEDICAL CENTER | Age: 48
End: 2023-03-20
Attending: INTERNAL MEDICINE
Payer: COMMERCIAL

## 2023-03-20 DIAGNOSIS — Z12.31 VISIT FOR SCREENING MAMMOGRAM: ICD-10-CM

## 2023-03-20 PROCEDURE — 77063 BREAST TOMOSYNTHESIS BI: CPT

## 2023-04-17 ENCOUNTER — APPOINTMENT (OUTPATIENT)
Dept: MEDICAL GROUP | Facility: MEDICAL CENTER | Age: 48
End: 2023-04-17
Payer: COMMERCIAL

## 2023-04-18 ENCOUNTER — TELEPHONE (OUTPATIENT)
Dept: MEDICAL GROUP | Facility: MEDICAL CENTER | Age: 48
End: 2023-04-18

## 2023-04-18 DIAGNOSIS — Z12.11 COLON CANCER SCREENING: ICD-10-CM

## 2023-09-14 ENCOUNTER — OFFICE VISIT (OUTPATIENT)
Dept: MEDICAL GROUP | Facility: MEDICAL CENTER | Age: 48
End: 2023-09-14
Payer: COMMERCIAL

## 2023-09-14 VITALS
DIASTOLIC BLOOD PRESSURE: 62 MMHG | WEIGHT: 145.5 LBS | TEMPERATURE: 98.3 F | HEART RATE: 81 BPM | OXYGEN SATURATION: 99 % | SYSTOLIC BLOOD PRESSURE: 134 MMHG | BODY MASS INDEX: 26.61 KG/M2

## 2023-09-14 DIAGNOSIS — Z13.220 LIPID SCREENING: ICD-10-CM

## 2023-09-14 DIAGNOSIS — Z00.00 ANNUAL PHYSICAL EXAM: Primary | ICD-10-CM

## 2023-09-14 DIAGNOSIS — N94.6 DYSMENORRHEA: ICD-10-CM

## 2023-09-14 DIAGNOSIS — Z30.09 FAMILY PLANNING: ICD-10-CM

## 2023-09-14 DIAGNOSIS — Z00.00 PREVENTATIVE HEALTH CARE: ICD-10-CM

## 2023-09-14 DIAGNOSIS — Z12.11 COLON CANCER SCREENING: ICD-10-CM

## 2023-09-14 DIAGNOSIS — R73.03 PREDIABETES: ICD-10-CM

## 2023-09-14 DIAGNOSIS — Z12.31 ENCOUNTER FOR SCREENING MAMMOGRAM FOR BREAST CANCER: ICD-10-CM

## 2023-09-14 DIAGNOSIS — Z11.51 ENCOUNTER FOR SCREENING FOR HUMAN PAPILLOMAVIRUS (HPV): ICD-10-CM

## 2023-09-14 DIAGNOSIS — E55.9 VITAMIN D DEFICIENCY: ICD-10-CM

## 2023-09-14 DIAGNOSIS — Z13.29 THYROID DISORDER SCREEN: ICD-10-CM

## 2023-09-14 PROCEDURE — 99396 PREV VISIT EST AGE 40-64: CPT | Performed by: INTERNAL MEDICINE

## 2023-09-14 PROCEDURE — 3078F DIAST BP <80 MM HG: CPT | Performed by: INTERNAL MEDICINE

## 2023-09-14 PROCEDURE — 3075F SYST BP GE 130 - 139MM HG: CPT | Performed by: INTERNAL MEDICINE

## 2023-09-14 RX ORDER — NORGESTIMATE AND ETHINYL ESTRADIOL 0.25-0.035
1 KIT ORAL DAILY
Qty: 84 TABLET | Refills: 0 | Status: SHIPPED | OUTPATIENT
Start: 2023-09-14 | End: 2023-11-28 | Stop reason: SDUPTHER

## 2023-09-14 ASSESSMENT — FIBROSIS 4 INDEX: FIB4 SCORE: 0.96

## 2023-09-14 NOTE — PROGRESS NOTES
Subjective:     CC: annual physical     HPI:   Flores Arias is a 47 y.o. female who presents for annual exam. She is feeling well and denies any complaints.    Problem   Dysmenorrhea    Patient was prescribed Sprintec by her prior PCP, we have discussed, that it does increase risk of blood clots in patients 35 and old.  Patient was counseled on potential side effects and complication, including DVT, PE, stroke.  Patient will be referred to GYN to discuss alternative management for dysmenorrhea.        Ob-Gyn/ History:    Patient has GYN provider: NO, referred   /Para:  4/2/2  Last Pap Smear:  2022 . no history of abnormal pap smears.  Gyn Surgery:  none .  Current Contraceptive Method:  OCP.     Health Maintenance}  Cholesterol Screenin   Diabetes Screenin   Diet: home cooked  + take outs   Exercise: daily walking every   Safe in relationship.   Seat belts, bike helmet, gun safety discussed.  Sun protection used.    Cancer screening  Colorectal Cancer Screening: Referred for colonoscopy  Lung Cancer Screening: n/a   Cervical Cancer Screenin   Breast Cancer Screenin    Infectious disease screening/Immunizations  --STI Screening: deferred   --Practices safe sex.  --Immunizations:    Tetanus: Dec 20, 2019   Shingles: n/a    COVID-19: x3    Hepatitis B: UTD     She  has no past medical history of ASTHMA or Diabetes.  She  has a past surgical history that includes breast biopsy () and appendectomy laparoscopic (N/A, 2016).    Family History   Problem Relation Age of Onset    Diabetes Mother     Hypertension Father     Heart Disease Father         MI age 69    Diabetes Father     Alcohol/Drug Brother        Social History     Socioeconomic History    Marital status:      Spouse name: Not on file    Number of children: Not on file    Years of education: Not on file    Highest education level: 12th grade   Occupational History    Not on file   Tobacco Use     Smoking status: Never    Smokeless tobacco: Never   Vaping Use    Vaping Use: Never used   Substance and Sexual Activity    Alcohol use: Not Currently     Comment: once per month    Drug use: No    Sexual activity: Yes     Partners: Male   Other Topics Concern    Not on file   Social History Narrative    Not on file     Social Determinants of Health     Financial Resource Strain: Low Risk  (5/17/2022)    Overall Financial Resource Strain (CARDIA)     Difficulty of Paying Living Expenses: Not very hard   Food Insecurity: No Food Insecurity (5/17/2022)    Hunger Vital Sign     Worried About Running Out of Food in the Last Year: Never true     Ran Out of Food in the Last Year: Never true   Transportation Needs: No Transportation Needs (5/17/2022)    PRAPARE - Transportation     Lack of Transportation (Medical): No     Lack of Transportation (Non-Medical): No   Physical Activity: Inactive (5/17/2022)    Exercise Vital Sign     Days of Exercise per Week: 0 days     Minutes of Exercise per Session: 0 min   Stress: No Stress Concern Present (5/17/2022)    Citizen of [place] [employer] Occupational [employer] - Occupational Stress Questionnaire     Feeling of Stress : Not at all   Social Connections: Moderately Isolated (5/17/2022)    Social Connection and Isolation Panel [NHANES]     Frequency of Communication with Friends and Family: Twice a week     Frequency of Social Gatherings with Friends and Family: Twice a week     Attends Worship Services: Never     Active Member of Clubs or Organizations: No     Attends Club or Organization Meetings: Never     Marital Status:    Intimate Partner Violence: Not on file   Housing Stability: Low Risk  (5/17/2022)    Housing Stability Vital Sign     Unable to Pay for Housing in the Last Year: No     Number of Places Lived in the Last Year: 1     Unstable Housing in the Last Year: No     Patient Active Problem List    Diagnosis Date Noted    Dysmenorrhea 08/26/2021    Stressful life event  affecting family 06/17/2020    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 84 Tablet 0     No current facility-administered medications for this visit.     No Known Allergies    Review of Systems:  Constitutional: Negative for fever, chills and malaise/fatigue.   HENT: Negative for congestion.    Eyes: Negative for pain.    Respiratory: Negative for cough and shortness of breath.  Cardiovascular: Negative for leg swelling.   Gastrointestinal: Negative for nausea, vomiting, abdominal pain and diarrhea.   Genitourinary: Negative for dysuria and hematuria.   Skin: Negative for rash.   Neurological: Negative for dizziness, focal weakness and headaches.   Endo/Heme/Allergies: Does not bleed easily.   Psychiatric/Behavioral: Negative for depression.  The patient is not nervous/anxious.      Objective:     /62 (BP Location: Left arm, Patient Position: Sitting, BP Cuff Size: Adult)   Pulse 81   Temp 36.8 °C (98.3 °F) (Temporal)   Wt 66 kg (145 lb 8.1 oz)   SpO2 99%   BMI 26.61 kg/m²   Body mass index is 26.61 kg/m².  Wt Readings from Last 4 Encounters:   09/14/23 66 kg (145 lb 8.1 oz)   04/18/23 63 kg (138 lb 14.2 oz)   06/17/22 69 kg (152 lb 1.9 oz)   05/18/22 69.8 kg (153 lb 15.9 oz)       Physical Exam  Constitutional:       Appearance: Normal appearance. She is normal weight.   HENT:      Head: Normocephalic and atraumatic.      Right Ear: Tympanic membrane, ear canal and external ear normal. There is no impacted  cerumen.      Left Ear: Tympanic membrane, ear canal and external ear normal. There is no impacted cerumen.      Nose: Nose normal. No congestion.      Mouth/Throat:      Mouth: Mucous membranes are moist.      Pharynx: Oropharynx is clear.   Eyes:      Conjunctiva/sclera: Conjunctivae normal.   Cardiovascular:      Rate and Rhythm: Normal rate and regular rhythm.      Pulses: Normal pulses.      Heart sounds: Normal heart sounds. No murmur heard.  Pulmonary:      Effort: Pulmonary effort is normal. No respiratory distress.      Breath sounds: Normal breath sounds.   Skin:     General: Skin is warm.   Neurological:      General: No focal deficit present.      Mental Status: She is alert and oriented to person, place, and time.   Psychiatric:         Mood and Affect: Mood normal.       Assessment and Plan:     Problem List Items Addressed This Visit       Dysmenorrhea    Prediabetes    Relevant Orders    HEMOGLOBIN A1C    Preventative health care    Relevant Orders    CBC WITH DIFFERENTIAL    Comp Metabolic Panel     Other Visit Diagnoses       Annual physical exam    -  Primary    Family planning        Relevant Medications    norgestimate-ethinyl estradiol (SPRINTEC 28) 0.25-35 MG-MCG per tablet    Other Relevant Orders    Referral to Gynecology    Encounter for screening for human papillomavirus (HPV)        Relevant Medications    norgestimate-ethinyl estradiol (SPRINTEC 28) 0.25-35 MG-MCG per tablet    Encounter for screening mammogram for breast cancer        Relevant Orders    MA-SCREENING MAMMO BILAT W/TOMOSYNTHESIS W/CAD    Colon cancer screening        Relevant Orders    Referral to GI for Colonoscopy    Lipid screening        Relevant Orders    Lipid Profile    Thyroid disorder screen        Relevant Orders    TSH WITH REFLEX TO FT4    Vitamin D deficiency        Relevant Orders    VITAMIN D,25 HYDROXY (DEFICIENCY)          HCM:  as above   Labs per orders  Immunizations per orders  Patient counseled about  skin care, diet, supplements, prenatal vitamins, safe sex and exercise.      Follow-up: Return in about 1 year (around 9/14/2024), or if symptoms worsen or fail to improve, for pending labs .    Please note that this dictation was created using voice recognition software. I have made every reasonable attempt to correct obvious errors, but I expect that there are errors of grammar and possibly content that I did not discover before finalizing the note.

## 2023-11-28 DIAGNOSIS — Z30.09 FAMILY PLANNING: ICD-10-CM

## 2023-11-28 DIAGNOSIS — Z11.51 ENCOUNTER FOR SCREENING FOR HUMAN PAPILLOMAVIRUS (HPV): ICD-10-CM

## 2023-11-28 RX ORDER — NORGESTIMATE AND ETHINYL ESTRADIOL 0.25-0.035
1 KIT ORAL DAILY
Qty: 84 TABLET | Refills: 0 | Status: SHIPPED | OUTPATIENT
Start: 2023-11-28

## 2024-01-16 ENCOUNTER — OFFICE VISIT (OUTPATIENT)
Dept: OBGYN | Facility: CLINIC | Age: 49
End: 2024-01-16
Payer: COMMERCIAL

## 2024-01-16 VITALS
BODY MASS INDEX: 27.97 KG/M2 | HEIGHT: 62 IN | SYSTOLIC BLOOD PRESSURE: 136 MMHG | WEIGHT: 152 LBS | DIASTOLIC BLOOD PRESSURE: 84 MMHG

## 2024-01-16 DIAGNOSIS — Z01.419 ENCOUNTER FOR WELL WOMAN EXAM: ICD-10-CM

## 2024-01-16 DIAGNOSIS — N94.6 DYSMENORRHEA: ICD-10-CM

## 2024-01-16 PROCEDURE — 3079F DIAST BP 80-89 MM HG: CPT | Performed by: STUDENT IN AN ORGANIZED HEALTH CARE EDUCATION/TRAINING PROGRAM

## 2024-01-16 PROCEDURE — 99213 OFFICE O/P EST LOW 20 MIN: CPT | Performed by: STUDENT IN AN ORGANIZED HEALTH CARE EDUCATION/TRAINING PROGRAM

## 2024-01-16 PROCEDURE — 3075F SYST BP GE 130 - 139MM HG: CPT | Performed by: STUDENT IN AN ORGANIZED HEALTH CARE EDUCATION/TRAINING PROGRAM

## 2024-01-16 PROCEDURE — 99386 PREV VISIT NEW AGE 40-64: CPT | Mod: 25 | Performed by: STUDENT IN AN ORGANIZED HEALTH CARE EDUCATION/TRAINING PROGRAM

## 2024-01-16 ASSESSMENT — FIBROSIS 4 INDEX: FIB4 SCORE: 0.98

## 2024-01-16 NOTE — PROGRESS NOTES
ANNUAL GYNECOLOGY VISIT    Chief Complaint  Annual Exam  New Patient (Annual)      Subjective  Flores Arias is a 48 y.o. female  Patient's last menstrual period was 2023 (exact date). Currently is on SPRINTEC for contraception. This was started by her her previous primary care doctor. She recently saw her new PCP who discussed with her that being on OCP increase risk of blood clots in patients 35 and old. Patient was counseled on potential side effects and complication, including DVT, PE, stroke. Patient was referred here to discuss alternative management for dysmenorrhea.   Presents today for Annual Exam and to discuss her dysmenorrhea.    She states that she has been on her SPRINTEC x 6 years and it has helped her tremendously with her heavy and long periods. She denies any tobacco use. Denies any migraines with aura. Denies any VTE/clotting disorder.States has had an US in the past and was told she had a fibroid. States this was approximately 6 years ago and has not had any follow up since. No pelvic pain. No n/v/d. No urinary symptoms. Unsure of when her mother went through menopause.       Preventive Care   Immunization History   Administered Date(s) Administered    Hepatitis A Vaccine, Adult 2020    Hepatitis B Vaccine (Adol/Adult) 2015, 2016, 2016, 2016    Influenza Vaccine Quad Inj (Pf) 2016, 2019    PFIZER AGUIRRE CAP SARS-COV-2 VACCINATION (12+) 2022    PFIZER PURPLE CAP SARS-COV-2 VACCINATION (12+) 2021, 10/15/2021    Tdap Vaccine 2019     Last Pap: 2022 NILM; HPV negative  Any abnormal pap smears?  yes - years ago and had HPV  Last Mammogram: 2023  Last Colonoscopy: had cologuard ordered 2023  Last DEXA: n/a  Immunizations: all desired UTD      Gynecology History  Current Sexual Activity: no  Currently in a relationship: no  Feel safe in your current relationship: n/a  History of sexually transmitted diseases?  yes - in her 20s had chlamydia and treated for this  Abnormal discharge? no  Menopause: no  Postmenopausal bleeding: no    Menstrual History  Patient's last menstrual period was 2023 (exact date).  Periods are regular every 28-30 days, lasting 5 days.   Dysmenorrhea :none. No intermenstrual bleeding, spotting, or discharge.  PMS symptoms?  no    Contraception  Current: BCP   Past: none      Cancer Risk Assessement:  Family history of:   - Breast cancer: no   - Ovarian cancer: no   - Uterine cancer: no   - Colon cancer: no    Obstetric History  OB History    Para Term  AB Living   4 2 2   2 2   SAB IAB Ectopic Molar Multiple Live Births     2       2      # Outcome Date GA Lbr Hardik/2nd Weight Sex Delivery Anes PTL Lv   4 Term 02    M Vag-Spont   [facility]   3 Term 97    F Vag-Spont   [facility]   2 IAB            1 IAB                Past Medical History  History reviewed. No pertinent past medical history.    Past Surgical History  Past Surgical History:   Procedure Laterality Date    APPENDECTOMY LAPAROSCOPIC N/A 2016    Procedure: APPENDECTOMY LAPAROSCOPIC;  Surgeon: Pilar Jennings M.D.;  Location: SURGERY [facility];  Service:     BREAST BIOPSY      left breast, benign biopsy       Social History  Social History     Tobacco Use    Smoking status: Never    Smokeless tobacco: Never   Vaping Use    Vaping Use: Never used   Substance Use Topics    Drug use: No        Family History  Family History   Problem Relation Age of Onset    Diabetes Mother     Hypertension Father     Heart Disease Father         MI age 69    Diabetes Father     Alcohol/Drug Brother        Home Medications  Current Outpatient Medications   Medication Sig    norgestimate-ethinyl estradiol (SPRINTEC 28) 0.25-35 MG-MCG per tablet Take 1 Tablet by mouth every day. 90 days courtesy refill. Further refills per GYN. Thank you       Allergies/Reactions  No Known Allergies    ROS  Review of Systems:dysmenorrhea  Gen:  "no fevers or chills, no significant weight loss or gain  Respiratory:  no cough or dyspnea  Cardiac:  no chest pain, no palpitations, no syncope  GI:  no heartburn, no abdominal pain, no nausea or vomiting  Psych: no depression or anxiety    Objective  /84 (BP Location: Left arm, Patient Position: Sitting, BP Cuff Size: Adult)   Ht 5' 2\"   Wt 152 lb   LMP 12/11/2023 (Exact Date) Comment: pos  BMI 27.80 kg/m²     Constitutional: The patient is well developed and well nourished.  Psychiatric: Patient is oriented to time place and person.   Skin: No rash observed.  Neck: Appears symmetric. Thyroid normal size  Respiratory: normal effort  Breast: declines  Abdomen: Soft, non-tender.  Pelvic Exam: declines  Extremities: Legs are symmetric and without tenderness. There is no edema present.      Assessment & Plan  Flores Arias is a 48 y.o. female who presents today for Annual Gyn Exam.     1. Health Maintenance   PAP: UTD  STI Screen (HIV, Syphilis, Chlamydia / Gonorrhea): declines  MAMMOGRAM: UTD  COLONOSCOPY: has cologuard ordered from PCP-encouraged her to get this done  DEXA: n/a  IMMUNIZATIONS: all desired UTD  TOBACCO: declines use  DIABETES SCREEN: UTD  CHOLESTEROL SCREEN: UTD  COUNSELING: breast self exam and mammography screening    2. Dysmenorrhea  Pt referred her from her PCP due to dysmenorrhea. Has been on SPRINTEC OCP x 6 years, which has helped her dysmenorrhea significantly. It was advised via her PCP and by me that we discuss that being on an OCP after the age of 35 can increase her risk for blood clots. She was also counseled on potential side effects and complication, including DVT, PE, stroke. At this time through shared decision making we have discussed ordering an US to evaluate any other reasons for her dysmenorrhea. We also discussed that a Progesterone IUD (Mirena) can be a good alternative options to dysmenorrhea and decrease her risks of clots, PE, Stroke, heart attack, etc. A " pamphlet was given to the patient here today so that she can educate herself on this option to see if it is a good option for her. Once her US has been performed I would like to see her back in the clinic to go over her results and to potentially move forward with the Mirena IUD.   - US-PELVIC COMPLETE (TRANSABDOMINAL/TRANSVAGINAL) (COMBO); Future      Return: Annually or PRN    Cinthya Beltran P.A.-C.  1/16/2024

## 2024-02-12 ENCOUNTER — HOSPITAL ENCOUNTER (OUTPATIENT)
Dept: RADIOLOGY | Facility: MEDICAL CENTER | Age: 49
End: 2024-02-12
Attending: STUDENT IN AN ORGANIZED HEALTH CARE EDUCATION/TRAINING PROGRAM
Payer: COMMERCIAL

## 2024-02-12 DIAGNOSIS — N94.6 DYSMENORRHEA: ICD-10-CM

## 2024-02-12 PROCEDURE — 76830 TRANSVAGINAL US NON-OB: CPT

## 2024-02-14 ENCOUNTER — GYNECOLOGY VISIT (OUTPATIENT)
Dept: OBGYN | Facility: CLINIC | Age: 49
End: 2024-02-14
Payer: COMMERCIAL

## 2024-02-14 VITALS — DIASTOLIC BLOOD PRESSURE: 83 MMHG | SYSTOLIC BLOOD PRESSURE: 138 MMHG | WEIGHT: 153.7 LBS | BODY MASS INDEX: 28.11 KG/M2

## 2024-02-14 DIAGNOSIS — D25.9 UTERINE LEIOMYOMA, UNSPECIFIED LOCATION: ICD-10-CM

## 2024-02-14 PROCEDURE — 3079F DIAST BP 80-89 MM HG: CPT | Performed by: OBSTETRICS & GYNECOLOGY

## 2024-02-14 PROCEDURE — 3075F SYST BP GE 130 - 139MM HG: CPT | Performed by: OBSTETRICS & GYNECOLOGY

## 2024-02-14 PROCEDURE — 99213 OFFICE O/P EST LOW 20 MIN: CPT | Performed by: OBSTETRICS & GYNECOLOGY

## 2024-02-14 RX ORDER — ACETAMINOPHEN AND CODEINE PHOSPHATE 120; 12 MG/5ML; MG/5ML
1 SOLUTION ORAL DAILY
Qty: 84 TABLET | Refills: 3 | Status: SHIPPED | OUTPATIENT
Start: 2024-02-14

## 2024-02-14 ASSESSMENT — FIBROSIS 4 INDEX: FIB4 SCORE: 0.98

## 2024-02-14 NOTE — PROGRESS NOTES
GYN Consult    CC/reason for consult: uterine fibroids    HPI: Flores Arias is a 48 y.o.  with known uterine fibroids that has been taking an OCP to control bleeding for the last 6 years. She is hear to discuss fibroids and better options for management as her current management puts her at increased risk for VTE. She does not have AUB unless she comes off of her OCP. Currently taking Sprintec.     ROS:  Constitutional: No fever, weight loss, weight gain, or fatigue  Skin/breast: No breast lump, nipple discharge, acne  Cardiovascular: No chest pain, leg swelling, palpitations  Respiratory: No shortness of breath, wheezing, or cough  Genitourinary: No pelvic pain, vaginal discharge, painful urination, abnormal periods, involuntary loss of urine, or vaginal bulge.  GI: No diarrhea, constipation, blood in stool, vomiting, abdominal pain  Endocrine: No hot flashes, abnormal thirst  Psychiatric: No depression, anxiety, or thoughts of self-harm  Neurologic: No headaches or seizures  Heme/lymph: No enlarged lymph nodes, denies bruising/bleeding that will not stop  Allergic: Denies allergies  MSK: Denies muscle weakness        GYN History:  Menarche @10.  Last Pap: 2022 NILM; HPV negative, she said that she was found to be positive for HPV approximately 10 years ago and have all been normal since. Had chlamydia in her 20s.        OB history:    OB History    Para Term  AB Living   4 2 2   2 2   SAB IAB Ectopic Molar Multiple Live Births     2       2      # Outcome Date GA Lbr Hardik/2nd Weight Sex Delivery Anes PTL Lv   4 Term 02    M Vag-Spont   [facility]   3 Term 97    F Vag-Spont   [facility]   2 IAB            1 IAB                History reviewed. No pertinent past medical history.    Past Surgical History:   Procedure Laterality Date    APPENDECTOMY LAPAROSCOPIC N/A 2016    Procedure: APPENDECTOMY LAPAROSCOPIC;  Surgeon: Pilar Jennings M.D.;  Location: SURGERY [facility];   Service:     BREAST BIOPSY  2015    left breast, benign biopsy       Medications:   Current Outpatient Medications Ordered in Epic   Medication Sig Dispense Refill    norethindrone (MICRONOR) 0.35 MG tablet Take 1 Tablet by mouth every day. 84 Tablet 3    norgestimate-ethinyl estradiol (SPRINTEC 28) 0.25-35 MG-MCG per tablet Take 1 Tablet by mouth every day. 90 days courtesy refill. Further refills per GYN. Thank you 84 Tablet 0     No current Epic-ordered facility-administered medications on file.       Allergies: Patient has no known allergies.    Social History     Socioeconomic History    Marital status:     Highest education level: 12th grade   Tobacco Use    Smoking status: Never    Smokeless tobacco: Never   Vaping Use    Vaping Use: Never used   Substance and Sexual Activity    Alcohol use: Not Currently     Comment: once per month    Drug use: No    Sexual activity: Not Currently     Partners: Male     Birth control/protection: Pill     Social Determinants of Health     Financial Resource Strain: Low Risk  (5/17/2022)    Overall Financial Resource Strain (CARDIA)     Difficulty of Paying Living Expenses: Not very hard   Food Insecurity: No Food Insecurity (5/17/2022)    Hunger Vital Sign     Worried About Running Out of Food in the Last Year: Never true     Ran Out of Food in the Last Year: Never true   Transportation Needs: No Transportation Needs (5/17/2022)    PRAPARE - Transportation     Lack of Transportation (Medical): No     Lack of Transportation (Non-Medical): No   Physical Activity: Inactive (5/17/2022)    Exercise Vital Sign     Days of Exercise per Week: 0 days     Minutes of Exercise per Session: 0 min   Stress: No Stress Concern Present (5/17/2022)    [employer] [place] [employer] - Occupational Stress Questionnaire     Feeling of Stress : Not at all   Social Connections: Moderately Isolated (5/17/2022)    Social Connection and Isolation Panel [NHANES]     Frequency of  Communication with Friends and Family: Twice a week     Frequency of Social Gatherings with Friends and Family: Twice a week     Attends Taoism Services: Never     Active Member of Clubs or Organizations: No     Attends Club or Organization Meetings: Never     Marital Status:    Housing Stability: Low Risk  (2022)    Housing Stability Vital Sign     Unable to Pay for Housing in the Last Year: No     Number of Places Lived in the Last Year: 1     Unstable Housing in the Last Year: No       Family History   Problem Relation Age of Onset    Diabetes Mother     Hypertension Father     Heart Disease Father         MI age 69    Diabetes Father     Alcohol/Drug Brother      Denies hx of GI/GYN/breast cancers    Physical Exam:  /83 (BP Location: Right arm, Patient Position: Sitting, BP Cuff Size: Adult)   Wt 153 lb 11.2 oz   LMP 2024 (Approximate) Comment: pos  BMI 28.11 kg/m²   gen: AAO, NAD, affect appropriate  CV: RRR; no LE edema  Resp: Symmetric non labored breathing, CTAB  Abd: soft, NT, ND, no masses, no organomegaly, no hernias  : NEFG, normal urethral meatus, normal anus/perineum, normal vagina and cervix.  Uterus  midline, anteverted, no adnexal masses/tenderness  Skin: warm/dry, no lesions    A/P: 48 y.o.  with   1. Uterine leiomyoma, unspecified location  norethindrone (MICRONOR) 0.35 MG tablet        Will try POP. FU 3 months. If AUB with POP, consider Embx. Patient wants to avoid surgery. Can try Mirena if POP doesn't work and if Mirena doesn't work then she would be open to a hysterectomy.

## 2024-02-14 NOTE — PROGRESS NOTES
Pt here for Gyn visit  Confirmed good ph#, pharmacy, drug allergy, and medications on file.  LMP: 01/17/2024  Last pap:06/19/2022 Neg.  Last mammo:03/20/23  BCM:Pill  Pt states no other complaints for today.

## 2024-03-20 ENCOUNTER — HOSPITAL ENCOUNTER (OUTPATIENT)
Dept: RADIOLOGY | Facility: MEDICAL CENTER | Age: 49
End: 2024-03-20
Attending: INTERNAL MEDICINE
Payer: COMMERCIAL

## 2024-03-20 DIAGNOSIS — Z12.31 ENCOUNTER FOR SCREENING MAMMOGRAM FOR BREAST CANCER: ICD-10-CM

## 2024-03-20 PROCEDURE — 77067 SCR MAMMO BI INCL CAD: CPT

## 2025-02-07 ENCOUNTER — GYNECOLOGY VISIT (OUTPATIENT)
Dept: OBGYN | Facility: CLINIC | Age: 50
End: 2025-02-07
Payer: COMMERCIAL

## 2025-02-07 VITALS
HEIGHT: 62 IN | SYSTOLIC BLOOD PRESSURE: 132 MMHG | BODY MASS INDEX: 26.68 KG/M2 | HEART RATE: 102 BPM | WEIGHT: 145 LBS | DIASTOLIC BLOOD PRESSURE: 88 MMHG

## 2025-02-07 DIAGNOSIS — D25.9 UTERINE LEIOMYOMA, UNSPECIFIED LOCATION: ICD-10-CM

## 2025-02-07 DIAGNOSIS — Z30.41 ENCOUNTER FOR SURVEILLANCE OF CONTRACEPTIVE PILLS: ICD-10-CM

## 2025-02-07 DIAGNOSIS — N95.1 MENOPAUSAL SYMPTOMS: ICD-10-CM

## 2025-02-07 PROCEDURE — 99213 OFFICE O/P EST LOW 20 MIN: CPT

## 2025-02-07 RX ORDER — ACETAMINOPHEN AND CODEINE PHOSPHATE 120; 12 MG/5ML; MG/5ML
1 SOLUTION ORAL DAILY
Qty: 84 TABLET | Refills: 3 | Status: SHIPPED | OUTPATIENT
Start: 2025-02-07

## 2025-02-07 NOTE — PROGRESS NOTES
"GYN PROBLEM VISIT    CC:  Annual Exam (Patient is here for annual GYN exam.)       HPI: Patient is a 49 y.o.  Patient's last menstrual period was 2024 (approximate).  with  Micronor for contraception. She has been taking this for heavy menstrual periods due to uterine fibroids. She hasn't had a menses in 6 months and has been experiencing some hot flashes. She is not prepared for an annual visit or pap today so she will reschedule for march.      ROS:   General: denies fever / chills  HEENT: denies sore throat:  CV: denies chest pain:  Repiratory: denies shortness of breath  GI: denies abdominal pain  : denies dysuria:    PFSH:  I personally reviewed the past medical and surgical histories.     Social History     Tobacco Use    Smoking status: Never    Smokeless tobacco: Never   Vaping Use    Vaping status: Never Used   Substance Use Topics    Alcohol use: Not Currently     Comment: once per month    Drug use: No        Social History     Substance and Sexual Activity   Sexual Activity Not Currently    Partners: Male    Birth control/protection: Pill        ALLERGIES / REACTIONS:  No Known Allergies                        PHYSICAL EXAMINATION:  Vital Signs:   /88 (BP Location: Left arm, Patient Position: Sitting, BP Cuff Size: Adult)   Pulse (!) 102   Ht 5' 2\"   Wt 145 lb   LMP 2024 (Approximate)   BMI 26.52 kg/m²     Gen: appears well, NAD  Respiratory: normal effort  Abdomen: Soft, non-tender.  Pelvic Exam: declined    ASSESSMENT AND PLAN:  49 y.o.      1. Encounter for surveillance of contraceptive pills  Pts  has vasectomy.  Continue taking Micronor for heavy menstrual bleeding.   - norethindrone (MICRONOR) 0.35 MG tablet; Take 1 Tablet by mouth every day.  Dispense: 84 Tablet; Refill: 3    2. Uterine leiomyoma, unspecified location    - norethindrone (MICRONOR) 0.35 MG tablet; Take 1 Tablet by mouth every day.  Dispense: 84 Tablet; Refill: 3    3. Menopausal " symptoms  Discussed that menopause is defined as absence of menses for 1 year. Pt has gone 6 months without menses. Pt is experiencing hot flashes. Discussed with pt natural/herbal options are available to help with hot flashes. Discussed starting HRT for cardiovascular and bone loss protection as well as helping with hot flashes. Pt wants to discuss further at her annual appt.         Follow up in 1 month annual    NEGRITA Mullen.

## 2025-02-07 NOTE — PROGRESS NOTES
Patient is here to renew her birth control  Patient has not had a period in 6 months and her period prior to that was 6 months before that.  Patient currently taking micronor daily.  Good # [phone]  Pharmacy: [facility]

## 2025-05-13 ENCOUNTER — OFFICE VISIT (OUTPATIENT)
Dept: MEDICAL GROUP | Age: 50
End: 2025-05-13
Payer: COMMERCIAL

## 2025-05-13 VITALS
DIASTOLIC BLOOD PRESSURE: 80 MMHG | TEMPERATURE: 98.4 F | BODY MASS INDEX: 24.59 KG/M2 | RESPIRATION RATE: 16 BRPM | WEIGHT: 144 LBS | SYSTOLIC BLOOD PRESSURE: 126 MMHG | HEART RATE: 82 BPM | HEIGHT: 64 IN | OXYGEN SATURATION: 96 %

## 2025-05-13 DIAGNOSIS — R73.03 PREDIABETES: ICD-10-CM

## 2025-05-13 DIAGNOSIS — Z00.00 ANNUAL PHYSICAL EXAM: ICD-10-CM

## 2025-05-13 DIAGNOSIS — Z13.29 THYROID DISORDER SCREEN: ICD-10-CM

## 2025-05-13 DIAGNOSIS — E55.9 VITAMIN D DEFICIENCY: ICD-10-CM

## 2025-05-13 DIAGNOSIS — Z12.11 COLON CANCER SCREENING: ICD-10-CM

## 2025-05-13 DIAGNOSIS — Z13.220 LIPID SCREENING: ICD-10-CM

## 2025-05-13 DIAGNOSIS — E78.5 HYPERLIPIDEMIA LDL GOAL <100: ICD-10-CM

## 2025-05-13 DIAGNOSIS — R73.01 ELEVATED FASTING GLUCOSE: ICD-10-CM

## 2025-05-13 PROCEDURE — 3074F SYST BP LT 130 MM HG: CPT | Performed by: INTERNAL MEDICINE

## 2025-05-13 PROCEDURE — 3079F DIAST BP 80-89 MM HG: CPT | Performed by: INTERNAL MEDICINE

## 2025-05-13 PROCEDURE — 99396 PREV VISIT EST AGE 40-64: CPT | Performed by: INTERNAL MEDICINE

## 2025-05-13 ASSESSMENT — PATIENT HEALTH QUESTIONNAIRE - PHQ9: CLINICAL INTERPRETATION OF PHQ2 SCORE: 0

## 2025-05-13 NOTE — PROGRESS NOTES
Subjective:     CC:   Chief Complaint   Patient presents with    Annual Exam     Verbal consent was acquired by the patient to use [employer] ambient listening note generation during this visit Yes     History of Present Illness  Flores is a pleasant 49-year-old female  presenting for her annual preventive visit.    She has expressed interest in undergoing a Pap smear. She has been under the care of an OB-GYN at the same office but has not had a Pap smear recently.     She has a mammogram scheduled. She received a stool testing kit in the mail but was apprehensive about using it.     She is considering a colonoscopy and plans to schedule it during her vacation in July. She has previously contracted shingles.     She is currently on birth control medication. She has been under the care of [facility] for the past 16 to 17 years, where her vision has remained stable. She has been advised to get bifocals but has been hesitant.     She maintains regular dental check-ups every 6 months.     She does not have a dermatologist and declined a referral. She had a consultation with a dermatology office regarding a mole, which was determined to be benign.    Her diet typically includes carbohydrates and eggs in the morning, along with shredded cheese. She attempts to balance her carbohydrate intake with protein, consuming wheat toast or occasionally a tortilla. Her lunch usually consists of grilled chicken, although she sometimes opts for pasta. She avoids soda due to its weight gain effects and primarily drinks water. She consumes approximately 5 cups of coffee daily during the week, reducing to 1 cup on Saturday and Sunday mornings. She engages in significant physical activity, walking between 18,000 to 20,000 steps daily at her second job as a . She does not engage in strength training exercises such as lifting or push-ups. On weekends, she remains active, performing household chores that involve climbing  stairs.    She experiences occasional urinary incontinence when her bladder is full, but does not consider it severe enough to warrant specialist consultation.    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    Ob-Gyn/ History:    Patient has GYN provider: yes   /Para:  4/2/2  Last Pap Smear:  2022 . no history of abnormal pap smears.    Gyn Surgery: none .  Current Contraceptive Method:  OCP.     Health Maintenance  Diabetes Screening: pending   Diet: discussed   Exercise: a lot of walking   Substance Abuse: none  Safe in relationship.   Seat belts, bike helmet, gun safety discussed.  Sun protection used.    Cancer screening  Colorectal Cancer Screening: Referred for colonoscopy x2   Lung Cancer Screening: n/a    Cervical Cancer Screenin   Breast Cancer Screenin, pending     Infectious disease screening/Immunizations  --STI Screening: deferred   --Practices safe sex.   --Immunizations:               Tetanus: Dec 20, 2019              Shingles: Advised to receive through the pharmacy               COVID-19: x3               Hepatitis B: UTD    Prevnar 20: Advised to receive through the pharmacy     She  has no past medical history of ASTHMA or Diabetes.  She  has a past surgical history that includes breast biopsy () and appendectomy laparoscopic (N/A, 2016).    Family History   Problem Relation Age of Onset    Diabetes Mother     Hypertension Father     Heart Disease Father         MI age 69    Diabetes Father     Alcohol/Drug Brother        Social History     Socioeconomic History    Marital status:      Spouse name: Not on file    Number of children: Not on file    Years of education: Not on file    Highest education level: 12th grade   Occupational History    Not on file   Tobacco Use    Smoking status: Never    Smokeless tobacco: Never   Vaping Use    Vaping status: Never Used   Substance and  Sexual Activity    Alcohol use: Not Currently     Comment: once per month    Drug use: No    Sexual activity: Not Currently     Partners: Male     Birth control/protection: Pill   Other Topics Concern    Not on file   Social History Narrative    Not on file     Social Drivers of Health     Financial Resource Strain: Low Risk  (5/17/2022)    Overall Financial Resource Strain (CARDIA)     Difficulty of Paying Living Expenses: Not very hard   Food Insecurity: No Food Insecurity (5/17/2022)    Hunger Vital Sign     Worried About Running Out of Food in the Last Year: Never true     Ran Out of Food in the Last Year: Never true   Transportation Needs: No Transportation Needs (5/17/2022)    PRAPARE - Transportation     Lack of Transportation (Medical): No     Lack of Transportation (Non-Medical): No   Physical Activity: Inactive (5/17/2022)    Exercise Vital Sign     Days of Exercise per Week: 0 days     Minutes of Exercise per Session: 0 min   Stress: No Stress Concern Present (5/17/2022)    [employer] [place] [employer] - Occupational Stress Questionnaire     Feeling of Stress : Not at all   Social Connections: Moderately Isolated (5/17/2022)    Social Connection and Isolation Panel [NHANES]     Frequency of Communication with Friends and Family: Twice a week     Frequency of Social Gatherings with Friends and Family: Twice a week     Attends Religion Services: Never     Active Member of Clubs or Organizations: No     Attends Club or Organization Meetings: Never     Marital Status:    Intimate Partner Violence: Not on file   Housing Stability: Low Risk  (5/17/2022)    Housing Stability Vital Sign     Unable to Pay for Housing in the Last Year: No     Number of Places Lived in the Last Year: 1     Unstable Housing in the Last Year: No       Patient Active Problem List    Diagnosis Date Noted    Dysmenorrhea 08/26/2021    Stressful life event affecting family 06/17/2020    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 84 Tablet 3     No current facility-administered medications for this visit.     No Known Allergies    Review of Systems   Constitutional: Negative for fever, chills and malaise/fatigue.   HENT: Negative for congestion.    Eyes: Negative for pain.    Respiratory: Negative for cough and shortness of breath.  Cardiovascular: Negative for leg swelling.   Gastrointestinal: Negative for nausea, vomiting, abdominal pain and diarrhea.   Genitourinary: Negative for dysuria and hematuria.   Skin: Negative for rash.   Neurological: Negative for dizziness, focal weakness and headaches.   Endo/Heme/Allergies: Does not bleed easily.   Psychiatric/Behavioral: Negative for depression.  The patient is not nervous/anxious.       Objective:     /80 (BP Location: Left arm, Patient Position: Sitting, BP Cuff Size: Adult)   Pulse 82   Temp 36.9 °C (98.4 °F) (Temporal)   Resp 16   Ht 1.62 m (5' 3.78\")   Wt 65.3 kg (144 lb)   SpO2 96%   Breastfeeding No   BMI 24.89 kg/m²   Body mass index is 24.89 kg/m².  Wt Readings from Last 4 Encounters:   05/13/25 65.3 kg (144 lb)   02/07/25 65.8 kg (145 lb)   02/14/24 69.7 kg (153 lb 11.2 oz)   01/16/24 68.9 kg (152 lb)     Physical Exam  Constitutional:       Appearance: Normal appearance. She is normal weight.   HENT:      Head: Normocephalic and atraumatic.      Right Ear: Tympanic membrane, ear canal and external ear normal. There is no impacted cerumen.      Left Ear: Tympanic membrane, ear canal and " external ear normal. There is no impacted cerumen.      Nose: Nose normal. No congestion.      Mouth/Throat:      Mouth: Mucous membranes are moist.      Pharynx: Oropharynx is clear.   Eyes:      General: No scleral icterus.        Right eye: No discharge.         Left eye: No discharge.      Extraocular Movements: Extraocular movements intact.      Conjunctiva/sclera: Conjunctivae normal.      Pupils: Pupils are equal, round, and reactive to light.   Cardiovascular:      Rate and Rhythm: Normal rate and regular rhythm.      Pulses: Normal pulses.      Heart sounds: Normal heart sounds. No murmur heard.  Pulmonary:      Effort: Pulmonary effort is normal. No respiratory distress.      Breath sounds: Normal breath sounds.   Abdominal:      General: Abdomen is flat. There is no distension.      Palpations: Abdomen is soft.      Tenderness: There is no abdominal tenderness.   Musculoskeletal:      Right lower leg: No edema.      Left lower leg: No edema.   Skin:     General: Skin is warm.   Neurological:      General: No focal deficit present.      Mental Status: She is alert and oriented to person, place, and time.   Psychiatric:         Mood and Affect: Mood normal.         Assessment and Plan:     Assessment & Plan  1. Health maintenance.  - A Pap smear will be scheduled with Aleta Simms.  - A referral for a colonoscopy has been initiated, and she should receive a notification on [facility] once the referral is approved. She has been advised to schedule the procedure promptly if she wishes to have it done in July.   - The shingles vaccine and Prevnar 20 have been recommended, which can be administered at her local pharmacy.   - Orders for blood tests, including cholesterol, blood counts, A1c, blood sugar, thyroid, and vitamin D levels, have been placed.    2. Urinary incontinence.  - She experiences occasional urinary incontinence when her bladder is full.  - No significant pain or discomfort reported during the  physical examination.  - Discussed the option of seeing a specialist for potential treatments, including Botox injections, but she declined at this time.  - Will monitor symptoms and consider specialist referral if condition worsens.    3. Birth control.  - She is currently on birth control medication managed by GYN  - No changes in medication regimen at this time.  - No additional concerns or side effects reported.      Problem List Items Addressed This Visit       Elevated fasting glucose    Hyperlipidemia LDL goal <100    Relevant Orders    Lipid Profile    TSH WITH REFLEX TO FT4    Prediabetes    Relevant Orders    HEMOGLOBIN A1C     Other Visit Diagnoses         Colon cancer screening        Relevant Orders    Referral to GI for Colonoscopy      Annual physical exam        Relevant Orders    CBC WITH DIFFERENTIAL    Comp Metabolic Panel      Lipid screening          Thyroid disorder screen        Relevant Orders    TSH WITH REFLEX TO FT4      Vitamin D deficiency        Relevant Orders    VITAMIN D,25 HYDROXY (DEFICIENCY)          HCM:  as above    Labs per orders  Immunizations per orders  Patient counseled about skin care, diet, supplements, prenatal vitamins, safe sex and exercise.      Follow-up:   - The patient will follow up in 1 year for her next annual visit and PAP SMEAR WITH OFFICE    Please note that this dictation was created using voice recognition software. I have made every reasonable attempt to correct obvious errors, but I expect that there are errors of grammar and possibly content that I did not discover before finalizing the note.

## 2025-05-19 NOTE — Clinical Note
REFERRAL APPROVAL NOTICE         Sent on May 19, 2025                   Flores Arias  [address] [address]  [place] [address]                   Dear Ms. Arias,    After a careful review of the medical information and benefit coverage, [facility] has processed your referral. See below for additional details.    If applicable, you must be actively enrolled with your insurance for coverage of the authorized service. If you have any questions regarding your coverage, please contact your insurance directly.    REFERRAL INFORMATION   Referral #:  [id number]  Referred-To Provider    Referred-By Provider:  Gastroenterology    Chantell Chau M.D.   [facility]      [address] [place] [address] [address] [place] [id number] [phone] [address] [facility]  [place] [address]  [phone]    Referral Start Date:  05/13/2025  Referral End Date:   05/13/2026             SCHEDULING  If you do not already have an appointment, please call [phone] to make an appointment.     MORE INFORMATION  If you do not already have a [facility] account, sign up at: [email or web address]  You can access your medical information, make appointments, see lab results, billing information, and more.  If you have questions regarding this referral, please contact  the [facility] Referrals department at:             [phone]. Monday - Friday 8:00AM - 5:00PM.     Sincerely,    [facility]

## 2025-06-02 ENCOUNTER — HOSPITAL ENCOUNTER (OUTPATIENT)
Dept: RADIOLOGY | Facility: MEDICAL CENTER | Age: 50
End: 2025-06-02
Attending: INTERNAL MEDICINE
Payer: COMMERCIAL

## 2025-06-02 DIAGNOSIS — Z12.31 VISIT FOR SCREENING MAMMOGRAM: ICD-10-CM

## 2025-06-02 PROCEDURE — 77063 BREAST TOMOSYNTHESIS BI: CPT

## 2025-06-04 ENCOUNTER — RESULTS FOLLOW-UP (OUTPATIENT)
Dept: MEDICAL GROUP | Age: 50
End: 2025-06-04
Payer: COMMERCIAL

## 2025-07-22 ENCOUNTER — APPOINTMENT (OUTPATIENT)
Dept: MEDICAL GROUP | Age: 50
End: 2025-07-22
Payer: COMMERCIAL